# Patient Record
Sex: MALE | Race: WHITE | NOT HISPANIC OR LATINO | Employment: FULL TIME | URBAN - METROPOLITAN AREA
[De-identification: names, ages, dates, MRNs, and addresses within clinical notes are randomized per-mention and may not be internally consistent; named-entity substitution may affect disease eponyms.]

---

## 2017-06-06 ENCOUNTER — ALLSCRIPTS OFFICE VISIT (OUTPATIENT)
Dept: OTHER | Facility: OTHER | Age: 37
End: 2017-06-06

## 2017-08-24 ENCOUNTER — ALLSCRIPTS OFFICE VISIT (OUTPATIENT)
Dept: OTHER | Facility: OTHER | Age: 37
End: 2017-08-24

## 2018-01-12 VITALS
SYSTOLIC BLOOD PRESSURE: 136 MMHG | TEMPERATURE: 98.6 F | WEIGHT: 248.2 LBS | HEIGHT: 71 IN | HEART RATE: 102 BPM | OXYGEN SATURATION: 96 % | DIASTOLIC BLOOD PRESSURE: 94 MMHG | BODY MASS INDEX: 34.75 KG/M2

## 2018-01-13 VITALS
OXYGEN SATURATION: 98 % | WEIGHT: 244.38 LBS | TEMPERATURE: 98.1 F | SYSTOLIC BLOOD PRESSURE: 130 MMHG | DIASTOLIC BLOOD PRESSURE: 82 MMHG | BODY MASS INDEX: 34.21 KG/M2 | HEART RATE: 93 BPM | HEIGHT: 71 IN

## 2018-03-06 DIAGNOSIS — M25.519 SHOULDER PAIN, UNSPECIFIED CHRONICITY, UNSPECIFIED LATERALITY: ICD-10-CM

## 2018-03-06 DIAGNOSIS — F41.9 ANXIETY: Primary | ICD-10-CM

## 2018-03-06 RX ORDER — CLONAZEPAM 0.5 MG/1
0.5 TABLET ORAL EVERY 12 HOURS PRN
Qty: 60 TABLET | Refills: 0 | OUTPATIENT
Start: 2018-03-06 | End: 2018-10-19 | Stop reason: SDUPTHER

## 2018-03-06 RX ORDER — CLONAZEPAM 0.5 MG/1
1 TABLET ORAL EVERY 12 HOURS PRN
COMMUNITY
Start: 2016-12-06 | End: 2018-03-06 | Stop reason: SDUPTHER

## 2018-03-06 RX ORDER — TRAMADOL HYDROCHLORIDE 50 MG/1
1 TABLET ORAL 3 TIMES DAILY PRN
COMMUNITY
Start: 2016-12-06 | End: 2018-03-06 | Stop reason: SDUPTHER

## 2018-03-06 RX ORDER — TRAMADOL HYDROCHLORIDE 50 MG/1
50 TABLET ORAL 3 TIMES DAILY PRN
Qty: 45 TABLET | Refills: 0 | OUTPATIENT
Start: 2018-03-06 | End: 2018-10-19 | Stop reason: SDUPTHER

## 2018-04-18 ENCOUNTER — OFFICE VISIT (OUTPATIENT)
Dept: FAMILY MEDICINE CLINIC | Facility: CLINIC | Age: 38
End: 2018-04-18
Payer: COMMERCIAL

## 2018-04-18 VITALS
TEMPERATURE: 98.2 F | DIASTOLIC BLOOD PRESSURE: 74 MMHG | BODY MASS INDEX: 36.22 KG/M2 | SYSTOLIC BLOOD PRESSURE: 122 MMHG | OXYGEN SATURATION: 95 % | WEIGHT: 253 LBS | HEART RATE: 89 BPM | HEIGHT: 70 IN

## 2018-04-18 DIAGNOSIS — R06.2 WHEEZING: ICD-10-CM

## 2018-04-18 DIAGNOSIS — J45.901 ALLERGIC BRONCHITIS WITH ACUTE EXACERBATION: Primary | ICD-10-CM

## 2018-04-18 DIAGNOSIS — F90.1 ATTENTION-DEFICIT HYPERACTIVITY DISORDER, PREDOMINANTLY HYPERACTIVE TYPE: ICD-10-CM

## 2018-04-18 DIAGNOSIS — F40.10 SOCIAL ANXIETY DISORDER: ICD-10-CM

## 2018-04-18 PROCEDURE — 99213 OFFICE O/P EST LOW 20 MIN: CPT | Performed by: NURSE PRACTITIONER

## 2018-04-18 PROCEDURE — 3008F BODY MASS INDEX DOCD: CPT | Performed by: NURSE PRACTITIONER

## 2018-04-18 RX ORDER — MONTELUKAST SODIUM 10 MG/1
10 TABLET ORAL
Qty: 30 TABLET | Refills: 2 | Status: SHIPPED | OUTPATIENT
Start: 2018-04-18 | End: 2018-08-07 | Stop reason: SDUPTHER

## 2018-04-18 RX ORDER — METHYLPREDNISOLONE 4 MG/1
TABLET ORAL
Qty: 21 TABLET | Refills: 0 | Status: SHIPPED | OUTPATIENT
Start: 2018-04-18 | End: 2019-12-12 | Stop reason: ALTCHOICE

## 2018-04-18 NOTE — PROGRESS NOTES
Assessment/Plan:    No problem-specific Assessment & Plan notes found for this encounter  Diagnoses and all orders for this visit:    Allergic bronchitis with acute exacerbation  -     montelukast (SINGULAIR) 10 mg tablet; Take 1 tablet (10 mg total) by mouth daily at bedtime for 90 days  -     Methylprednisolone 4 MG TBPK; Use as directed on package    Wheezing  -     montelukast (SINGULAIR) 10 mg tablet; Take 1 tablet (10 mg total) by mouth daily at bedtime for 90 days  -     Methylprednisolone 4 MG TBPK; Use as directed on package    Attention-deficit hyperactivity disorder, predominantly hyperactive type    Social anxiety disorder          Subjective:      Patient ID: Erik Pena is a 41 y o  male  Patient here and reports that he was sick about a month ago and had symptoms for about a week with flu like symptoms and going away but has been having wheezing and feeling some chest tightness and past few days and occasional coughing with deep breathing         The following portions of the patient's history were reviewed and updated as appropriate:   He  has no past medical history on file  He   Patient Active Problem List    Diagnosis Date Noted    Wheezing 04/18/2018    Allergic bronchitis with acute exacerbation 04/18/2018    Attention-deficit hyperactivity disorder, predominantly hyperactive type 06/06/2017    Social anxiety disorder 06/06/2017     He  has no past surgical history on file  His family history is not on file  He  has no tobacco, alcohol, and drug history on file  He has No Known Allergies       Review of Systems   Constitutional: Negative  Negative for activity change, appetite change, chills, fatigue and fever  HENT: Positive for congestion and postnasal drip  Respiratory: Positive for cough and chest tightness  Cardiovascular: Negative  Gastrointestinal: Negative  Endocrine: Negative  Genitourinary: Negative  Musculoskeletal: Negative  Allergic/Immunologic: Negative  Neurological: Negative  Hematological: Negative  Psychiatric/Behavioral: Negative  Objective:      /74   Pulse 89   Temp 98 2 °F (36 8 °C)   Ht 5' 10" (1 778 m)   Wt 115 kg (253 lb)   SpO2 95%   BMI 36 30 kg/m²          Physical Exam   Constitutional: He is oriented to person, place, and time  Vital signs are normal  He appears well-developed and well-nourished  No distress  HENT:   Head: Normocephalic and atraumatic  Eyes: Pupils are equal, round, and reactive to light  Neck: Normal range of motion  No thyromegaly present  Cardiovascular: Normal rate, regular rhythm, normal heart sounds and intact distal pulses  No murmur heard  Pulmonary/Chest: Effort normal and breath sounds normal  No respiratory distress  He has no wheezes  Abdominal: Soft  Bowel sounds are normal    Musculoskeletal: Normal range of motion  Neurological: He is alert and oriented to person, place, and time  Skin: Skin is warm and dry  Psychiatric: He has a normal mood and affect  Nursing note and vitals reviewed

## 2018-08-07 DIAGNOSIS — J45.901 ALLERGIC BRONCHITIS WITH ACUTE EXACERBATION: ICD-10-CM

## 2018-08-07 DIAGNOSIS — R06.2 WHEEZING: ICD-10-CM

## 2018-08-07 RX ORDER — MONTELUKAST SODIUM 10 MG/1
TABLET ORAL
Qty: 30 TABLET | Refills: 2 | Status: SHIPPED | OUTPATIENT
Start: 2018-08-07 | End: 2020-07-31 | Stop reason: ALTCHOICE

## 2018-09-17 ENCOUNTER — TELEPHONE (OUTPATIENT)
Dept: FAMILY MEDICINE CLINIC | Facility: CLINIC | Age: 38
End: 2018-09-17

## 2018-10-19 DIAGNOSIS — M25.519 SHOULDER PAIN, UNSPECIFIED CHRONICITY, UNSPECIFIED LATERALITY: ICD-10-CM

## 2018-10-19 DIAGNOSIS — F41.9 ANXIETY: ICD-10-CM

## 2018-10-19 RX ORDER — TRAMADOL HYDROCHLORIDE 50 MG/1
50 TABLET ORAL 3 TIMES DAILY PRN
Qty: 45 TABLET | Refills: 0 | OUTPATIENT
Start: 2018-10-19 | End: 2019-06-11 | Stop reason: SDUPTHER

## 2018-10-19 RX ORDER — CLONAZEPAM 0.5 MG/1
0.5 TABLET ORAL EVERY 12 HOURS PRN
Qty: 60 TABLET | Refills: 0 | Status: SHIPPED | OUTPATIENT
Start: 2018-10-19 | End: 2019-06-11 | Stop reason: SDUPTHER

## 2019-03-15 ENCOUNTER — TELEPHONE (OUTPATIENT)
Dept: SURGERY | Facility: CLINIC | Age: 39
End: 2019-03-15

## 2019-03-18 ENCOUNTER — OFFICE VISIT (OUTPATIENT)
Dept: SURGERY | Facility: CLINIC | Age: 39
End: 2019-03-18
Payer: COMMERCIAL

## 2019-03-18 VITALS
SYSTOLIC BLOOD PRESSURE: 132 MMHG | BODY MASS INDEX: 35.65 KG/M2 | WEIGHT: 249 LBS | HEART RATE: 88 BPM | RESPIRATION RATE: 18 BRPM | HEIGHT: 70 IN | TEMPERATURE: 97.4 F | DIASTOLIC BLOOD PRESSURE: 70 MMHG

## 2019-03-18 DIAGNOSIS — L72.0 EPIDERMOID CYST: Primary | ICD-10-CM

## 2019-03-18 PROCEDURE — 88304 TISSUE EXAM BY PATHOLOGIST: CPT | Performed by: PATHOLOGY

## 2019-03-18 PROCEDURE — 99242 OFF/OP CONSLTJ NEW/EST SF 20: CPT | Performed by: SURGERY

## 2019-03-18 PROCEDURE — 11420 EXC H-F-NK-SP B9+MARG 0.5/<: CPT | Performed by: SURGERY

## 2019-03-18 RX ORDER — CYCLOBENZAPRINE HCL 10 MG
TABLET ORAL
COMMUNITY
End: 2022-04-20 | Stop reason: ALTCHOICE

## 2019-03-18 NOTE — LETTER
March 18, 2019     Patient: Alyssa Caban   YOB: 1980   Date of Visit: 3/18/2019       To Whom it May Concern:    Jorge Santino is under my professional care  He was seen in my office on 3/18/2019  Please excuse him from work on 03/18/2019 and 03/19/2019   If you have any questions or concerns, please don't hesitate to call           Sincerely,          Dax Ferrer PA-C        CC: No Recipients

## 2019-03-18 NOTE — LETTER
March 18, 2019     Patient: Jarret Hansen   YOB: 1980   Date of Visit: 3/18/2019       To Whom it May Concern:    Tia Roche is under my professional care  He was seen in my office on 3/18/2019  Please excuse him from work on this date  If you have any questions or concerns, please don't hesitate to call           Sincerely,          Dax Ferrer PA-C        CC: No Recipients

## 2019-03-18 NOTE — PROGRESS NOTES
Consult - General Surgery   Debra Pages 41 y o  male MRN: [id number]  Encounter: [id number]    Assessment/Plan    Epidermoid cyst  Patient presents with small, round, smooth nodules of the scalp consistent with 3 small epidermoid cysts and 1 recurrent epidermoid cyst  Excisional biopsy was offered  Risks related to anesthesia, bleeding, and infection were reviewed  Questions answered, informed consent obtained  Procedure completed uneventfully  Wound care instructions reviewed  Follow-up arranged  Diagnoses and all orders for this visit:    Epidermoid cyst  -     Skin excision    Other orders  -     cyclobenzaprine (FLEXERIL) 10 mg tablet; cyclobenzaprine 10 mg tablet  -     Cancel: Biopsy        History of Present Illness   03- Patient is here today for cyst on his scalp x 4  His has three on his right side of his scalp and on his on the back of his scalp and this one more is bothersome to him  Trevon Cheyenne    Pleasant 42 yo M here for cysts of scalp  Has history of one excision previously on his scalp with concern about recurrence as well as three others (4 total)  No signs of infection or inflammation  Bothersome  Interested in excision  Review of Systems   Constitutional: Negative for chills and fever  Respiratory: Negative for shortness of breath  Cardiovascular: Negative for chest pain  Gastrointestinal: Negative for abdominal pain  Skin: Negative for rash  Neurological: Negative for headaches         Historical Information   Past Medical History:   Diagnosis Date    Anxiety      Past Surgical History:   Procedure Laterality Date    SHOULDER ARTHROSCOPY Right     SPINAL FUSION       Social History   Social History     Substance and Sexual Activity   Alcohol Use Yes    Alcohol/week: 4 2 oz    Types: 7 Cans of beer per week    Frequency: 2-3 times a week    Drinks per session: 1 or 2    Binge frequency: Monthly     Social History     Substance and Sexual Activity   Drug Use Never     Social History     Tobacco Use   Smoking Status Light Tobacco Smoker    Types: Cigars   Smokeless Tobacco Never Used     Family History   Problem Relation Age of Onset    Lung cancer Father        Meds/Allergies     Current Outpatient Medications:     clonazePAM (KlonoPIN) 0 5 mg tablet, Take 1 tablet (0 5 mg total) by mouth every 12 (twelve) hours as needed for anxiety, Disp: 60 tablet, Rfl: 0    cyclobenzaprine (FLEXERIL) 10 mg tablet, cyclobenzaprine 10 mg tablet, Disp: , Rfl:     montelukast (SINGULAIR) 10 mg tablet, TAKE 1 TABLET (10 MG TOTAL) BY MOUTH DAILY AT BEDTIME, Disp: 30 tablet, Rfl: 2    traMADol (ULTRAM) 50 mg tablet, Take 1 tablet (50 mg total) by mouth 3 (three) times a day as needed for moderate pain, Disp: 45 tablet, Rfl: 0    Methylprednisolone 4 MG TBPK, Use as directed on package (Patient not taking: Reported on 3/18/2019), Disp: 21 tablet, Rfl: 0  Allergies   Allergen Reactions    Other Abdominal Pain     pollen       The following portions of the patient's history were reviewed and updated as appropriate: allergies, current medications, past family history, past medical history, past social history, past surgical history and problem list     Objective   Current Vitals:   Blood pressure 132/70, pulse 88, temperature (!) 97 4 °F (36 3 °C), temperature source Tympanic, resp  rate 18, height 5' 10" (1 778 m), weight 113 kg (249 lb)  Physical Exam   Constitutional: He is oriented to person, place, and time  He appears well-developed and well-nourished  No distress  HENT:   Head: Normocephalic and atraumatic  Eyes: Pupils are equal, round, and reactive to light  Neck: Normal range of motion  Pulmonary/Chest: No respiratory distress  Musculoskeletal: Normal range of motion  Neurological: He is alert and oriented to person, place, and time  Skin: Skin is warm and dry  He is not diaphoretic  Psychiatric: He has a normal mood and affect       Skin excision  Date/Time: 3/18/2019 2:16 PM  Performed by: Rajeev Ferrer PA-C  Authorized by: Rajeev Ferrer PA-C     Procedure Details - Skin Excision:     Number of Lesions:  4  Lesion 1:     Body area:  Head/neck    Head/neck location:  Scalp (R temple cyst)    Initial size (mm):  5       Malignancy: benign lesion      Repair type:  Linear closure    Closure complexity: simple      Repair size (cm):  1     Informed consent obtained  Positioned left lateral decubitus  Skin marked around cyst  Local anesthesia provided with 5 mL of 1% lidocaine w/epinephrine and bicarbonate  Straight incision made over the cyst with #15 scalpel  Dissection carried down to cyst which was enucleated intact and sent to pathology  Hemostasis good  Skin closed with 4 simple interrupted sutures of 3-0 nylon  Neosporin applied  No complications  Lesion 2:     Body area:  Head/neck    Head/neck location:  Scalp (Right superior parietal cyst)        Initial size (mm):  5    Malignancy: benign lesion      Repair type:  Linear closure    Closure complexity: simple      Repair size (cm):  1     Informed consent obtained  Positioned left lateral decubitus  Skin marked around cyst  Local anesthesia provided with 5 mL of 1% lidocaine w/epinephrine and bicarbonate  Straight incision made over the cyst with #15 scalpel  Dissection carried down to cyst which was removed in pieces and sent to pathology  No residual cyst wall or sebaceous material identified  Hemostasis good  Skin closed with 4 simple interrupted sutures of 3-0 nylon  Neosporin applied  No complications  Lesion 3:     Body area:  Head/neck    Head/neck location:  Scalp (Right inferior parietal)        Initial size (mm):  5    Malignancy: benign lesion      Repair type:  Linear closure    Closure complexity: simple       Informed consent obtained  Positioned left lateral decubitus   Skin marked around cyst  Local anesthesia provided with 5 mL of 1% lidocaine w/epinephrine and bicarbonate  Straight incision made over the cyst with #15 scalpel  Dissection carried down to cyst which was removed in pieces, there was not enough material to be sent to pathology  No residual cyst wall or sebaceous material identified  Hemostasis good  Skin closed with 4 simple interrupted sutures of 3-0 nylon  Neosporin applied  No complications  Lesion 4:     Body area:  Head/neck        Initial size (mm):  10    Malignancy: benign lesion      Repair type:  Linear closure    Closure complexity: simple      Repair size (cm):  1 5     Informed consent obtained  Positioned prone  Skin marked around cyst  Local anesthesia provided with 10 mL of 1% lidocaine w/epinephrine and bicarbonate  Straight incision made over the cyst with #15 scalpel  Dissection carried down to cyst which was removed in pieces and sent to pathology  No residual cyst wall or sebaceous material identified  Hemostasis good  Skin closed with 4 simple interrupted sutures of 3-0 nylon  Neosporin applied  No complications          Signature:  Syeda Ferrer PA-C  Date: 3/18/2019 Time: 2:14 PM

## 2019-03-18 NOTE — ASSESSMENT & PLAN NOTE
Patient presents with small, round, smooth nodules of the scalp consistent with 3 small epidermoid cysts and 1 recurrent epidermoid cyst  Excisional biopsy was offered  Risks related to anesthesia, bleeding, and infection were reviewed  Questions answered, informed consent obtained  Procedure completed uneventfully  Wound care instructions reviewed  Follow-up arranged

## 2019-03-21 ENCOUNTER — TELEPHONE (OUTPATIENT)
Dept: SURGERY | Facility: CLINIC | Age: 39
End: 2019-03-21

## 2019-03-21 NOTE — TELEPHONE ENCOUNTER
Phone patient to see how he was doing after his excision of scalp cyst on Monday 3-  Patient stated that the area's are a little puffing with no redness or drainage

## 2019-03-27 ENCOUNTER — OFFICE VISIT (OUTPATIENT)
Dept: SURGERY | Facility: CLINIC | Age: 39
End: 2019-03-27

## 2019-03-27 DIAGNOSIS — L72.12 TRICHILEMMAL CYST: Primary | ICD-10-CM

## 2019-03-27 PROCEDURE — 99024 POSTOP FOLLOW-UP VISIT: CPT | Performed by: PHYSICIAN ASSISTANT

## 2019-03-27 NOTE — PROGRESS NOTES
Post-Op Note - General Surgery   Natalie Mason 46 y o  male MRN: [id number]  Encounter: [id number]    Assessment/Plan    Trichilemmal cyst  Patient well after excision of trichilemmal cysts  No signs of infection  Wounds healing well  Benign pathology reviewed  Questions answered  Follow-up as needed  Diagnoses and all orders for this visit:    Trichilemmal cyst        Subjective    03- Patient is here today for suture removal scalp cyst x 4  The sutures were removed and the patient denied any new problems or concerns  Tanya Joy Minor 47 yo M here after elective trichilemmal cyst removal x 4 of his scalp  Reports no problems post-op  No pain, fevers, redness, drainage  Review of Systems   Constitutional: Negative for chills and fever  Respiratory: Negative for shortness of breath  Cardiovascular: Negative for chest pain  Gastrointestinal: Negative for abdominal pain  Skin: Negative for rash  Neurological: Negative for headaches  The following portions of the patient's history were reviewed and updated as appropriate: allergies, current medications, past family history, past medical history, past social history, past surgical history and problem list     Objective      There were no vitals taken for this visit  Physical Exam   Constitutional: He is oriented to person, place, and time  He appears well-developed and well-nourished  No distress  HENT:   Head: Normocephalic and atraumatic  Eyes: Pupils are equal, round, and reactive to light  Conjunctivae and EOM are normal    Neck: Normal range of motion  Pulmonary/Chest: No respiratory distress  Musculoskeletal: Normal range of motion  Neurological: He is alert and oriented to person, place, and time  Skin: Skin is warm and dry  Capillary refill takes less than 2 seconds  He is not diaphoretic  Incisions C/D/I and healing well  Psychiatric: He has a normal mood and affect   His behavior is normal  Signature:  Pradeep Ferrer PA-C  Date: 3/29/2019 Time: 10:44 AM

## 2019-03-29 PROBLEM — L72.12 TRICHILEMMAL CYST: Status: ACTIVE | Noted: 2019-03-18

## 2019-03-29 NOTE — ASSESSMENT & PLAN NOTE
Patient well after excision of trichilemmal cysts  No signs of infection  Wounds healing well  Benign pathology reviewed  Questions answered  Follow-up as needed

## 2019-06-11 DIAGNOSIS — M25.519 SHOULDER PAIN, UNSPECIFIED CHRONICITY, UNSPECIFIED LATERALITY: ICD-10-CM

## 2019-06-11 DIAGNOSIS — F41.9 ANXIETY: ICD-10-CM

## 2019-06-11 RX ORDER — CLONAZEPAM 0.5 MG/1
0.5 TABLET ORAL EVERY 12 HOURS PRN
Qty: 60 TABLET | Refills: 0 | Status: SHIPPED | OUTPATIENT
Start: 2019-06-11 | End: 2019-12-12 | Stop reason: SDUPTHER

## 2019-06-11 RX ORDER — TRAMADOL HYDROCHLORIDE 50 MG/1
50 TABLET ORAL 3 TIMES DAILY PRN
Qty: 45 TABLET | Refills: 0 | OUTPATIENT
Start: 2019-06-11 | End: 2019-06-14 | Stop reason: SDUPTHER

## 2019-06-14 DIAGNOSIS — M25.519 SHOULDER PAIN, UNSPECIFIED CHRONICITY, UNSPECIFIED LATERALITY: ICD-10-CM

## 2019-06-14 RX ORDER — TRAMADOL HYDROCHLORIDE 50 MG/1
50 TABLET ORAL 3 TIMES DAILY PRN
Qty: 45 TABLET | Refills: 0 | Status: SHIPPED | OUTPATIENT
Start: 2019-06-14 | End: 2019-12-12 | Stop reason: SDUPTHER

## 2019-06-16 NOTE — TELEPHONE ENCOUNTER
This is not my patient  He also hasnt been seen on over a year   No refill sof a controlled substance

## 2019-11-18 DIAGNOSIS — M25.519 SHOULDER PAIN, UNSPECIFIED CHRONICITY, UNSPECIFIED LATERALITY: ICD-10-CM

## 2019-11-18 DIAGNOSIS — F41.9 ANXIETY: ICD-10-CM

## 2019-11-18 RX ORDER — CLONAZEPAM 0.5 MG/1
0.5 TABLET ORAL EVERY 12 HOURS PRN
Qty: 60 TABLET | Refills: 0 | OUTPATIENT
Start: 2019-11-18

## 2019-11-18 RX ORDER — TRAMADOL HYDROCHLORIDE 50 MG/1
50 TABLET ORAL 3 TIMES DAILY PRN
Qty: 45 TABLET | Refills: 0 | OUTPATIENT
Start: 2019-11-18

## 2019-12-04 LAB — HBA1C MFR BLD HPLC: 5.4 %

## 2019-12-12 ENCOUNTER — OFFICE VISIT (OUTPATIENT)
Dept: FAMILY MEDICINE CLINIC | Facility: CLINIC | Age: 39
End: 2019-12-12
Payer: COMMERCIAL

## 2019-12-12 VITALS
WEIGHT: 250 LBS | HEIGHT: 70 IN | TEMPERATURE: 98.8 F | OXYGEN SATURATION: 98 % | DIASTOLIC BLOOD PRESSURE: 88 MMHG | SYSTOLIC BLOOD PRESSURE: 136 MMHG | BODY MASS INDEX: 35.79 KG/M2 | HEART RATE: 94 BPM

## 2019-12-12 DIAGNOSIS — Z23 NEED FOR TDAP VACCINATION: ICD-10-CM

## 2019-12-12 DIAGNOSIS — F40.10 SOCIAL ANXIETY DISORDER: ICD-10-CM

## 2019-12-12 DIAGNOSIS — F41.9 ANXIETY: ICD-10-CM

## 2019-12-12 DIAGNOSIS — Z11.3 SCREENING EXAMINATION FOR STD (SEXUALLY TRANSMITTED DISEASE): ICD-10-CM

## 2019-12-12 DIAGNOSIS — M25.511 CHRONIC RIGHT SHOULDER PAIN: Primary | ICD-10-CM

## 2019-12-12 DIAGNOSIS — G89.29 CHRONIC RIGHT SHOULDER PAIN: Primary | ICD-10-CM

## 2019-12-12 DIAGNOSIS — Z98.890 S/P SHOULDER SURGERY: ICD-10-CM

## 2019-12-12 DIAGNOSIS — Z79.899 CONTROLLED SUBSTANCE AGREEMENT SIGNED: ICD-10-CM

## 2019-12-12 DIAGNOSIS — E66.09 CLASS 2 OBESITY DUE TO EXCESS CALORIES WITHOUT SERIOUS COMORBIDITY WITH BODY MASS INDEX (BMI) OF 35.0 TO 35.9 IN ADULT: ICD-10-CM

## 2019-12-12 DIAGNOSIS — Z23 NEEDS FLU SHOT: ICD-10-CM

## 2019-12-12 DIAGNOSIS — Z13.220 SCREENING FOR LIPID DISORDERS: ICD-10-CM

## 2019-12-12 DIAGNOSIS — M25.519 SHOULDER PAIN, UNSPECIFIED CHRONICITY, UNSPECIFIED LATERALITY: ICD-10-CM

## 2019-12-12 PROBLEM — J45.901 ALLERGIC BRONCHITIS WITH ACUTE EXACERBATION: Status: RESOLVED | Noted: 2018-04-18 | Resolved: 2019-12-12

## 2019-12-12 PROBLEM — R06.2 WHEEZING: Status: RESOLVED | Noted: 2018-04-18 | Resolved: 2019-12-12

## 2019-12-12 PROBLEM — E66.812 CLASS 2 OBESITY DUE TO EXCESS CALORIES WITHOUT SERIOUS COMORBIDITY WITH BODY MASS INDEX (BMI) OF 35.0 TO 35.9 IN ADULT: Status: ACTIVE | Noted: 2019-12-12

## 2019-12-12 PROCEDURE — 3008F BODY MASS INDEX DOCD: CPT | Performed by: NURSE PRACTITIONER

## 2019-12-12 PROCEDURE — 90686 IIV4 VACC NO PRSV 0.5 ML IM: CPT | Performed by: NURSE PRACTITIONER

## 2019-12-12 PROCEDURE — 90472 IMMUNIZATION ADMIN EACH ADD: CPT | Performed by: NURSE PRACTITIONER

## 2019-12-12 PROCEDURE — 99214 OFFICE O/P EST MOD 30 MIN: CPT | Performed by: NURSE PRACTITIONER

## 2019-12-12 PROCEDURE — 90471 IMMUNIZATION ADMIN: CPT | Performed by: NURSE PRACTITIONER

## 2019-12-12 PROCEDURE — 90715 TDAP VACCINE 7 YRS/> IM: CPT | Performed by: NURSE PRACTITIONER

## 2019-12-12 RX ORDER — CLONAZEPAM 0.5 MG/1
0.5 TABLET ORAL EVERY 12 HOURS PRN
Qty: 60 TABLET | Refills: 0 | Status: SHIPPED | OUTPATIENT
Start: 2019-12-12 | End: 2020-03-02 | Stop reason: SDUPTHER

## 2019-12-12 RX ORDER — TRAMADOL HYDROCHLORIDE 50 MG/1
50 TABLET ORAL 3 TIMES DAILY PRN
Qty: 45 TABLET | Refills: 0 | Status: SHIPPED | OUTPATIENT
Start: 2019-12-12 | End: 2020-03-02 | Stop reason: SDUPTHER

## 2019-12-12 NOTE — PATIENT INSTRUCTIONS
Exercises for Shoulder Flexion and Extension   WHAT YOU NEED TO KNOW:   Shoulder flexion and extension exercises work the muscles in your upper back  DISCHARGE INSTRUCTIONS:   Contact your healthcare provider if:   · You have sharp or worsening pain during exercise or at rest     · You have questions or concerns about your shoulder exercises  Before you exercise:  Warm up and stretch before you exercise  Walk or ride a stationary bike for 5 to 10 minutes to help you warm up  Stretching helps increase range of motion  It may also decrease muscle soreness and help prevent another injury  Your healthcare provider will tell you which of the following stretches to do:  · Crossover arm stretch:  Relax your shoulders  Hold your upper arm with the opposite hand  Pull your arm across your chest until you feel a stretch  Hold the stretch for 30 seconds  Return to the starting position  · Shoulder flexion stretch:  Stand facing a wall  Slowly walk your fingers up the wall until you feel a stretch  Hold for 30 seconds  Return to the starting position  · Sleeper stretch:  Lie on your side on a firm, flat surface with your injured arm tucked under you  Place your head on a pillow for comfort  Bend the elbow of your injured arm 90°  Use your arm that is not injured to slowly push your injured arm down  Stop when you feel a stretch at the back of your injured shoulder  Hold for 30 seconds  Slowly return to the starting position  How to exercise with a weight:  Your healthcare provider will tell you how much weight to exercise with  · Shoulder extension:  Lie on a hard table on your stomach  Let your arms hang off the side  Hold a weight in both hands with your palms facing toward your body  Keep your arms straight and slowly raise your arms parallel to the floor in a "Y" shape  Stop when your arms are level with your body  Hold for as long as directed  Slowly return to the starting position       · Shoulder flexion: Stand and hold a weight in the hand of your injured shoulder  Keep your arm straight and slowly raise your arm over your head as far as you can without pain  Do not raise your arm over your head unless your healthcare provider says it is okay  Do not let your shoulder shrug  Hold this position for as many seconds as directed  Slowly return to the starting position  How to exercise with an exercise band:   · Shoulder extension:  Wrap the exercise band around a heavy, stable object  The band should be level with your chest  Stand and hold each end of the band in both hands  Step back and extend your arms straight  Squeeze your shoulder blades together and pull your arms back and down  Hold for as long as directed  Slowly return to the starting position  · Shoulder flexion:  Wrap the exercise band around a heavy, stable object near your foot  Grab the band with the hand of your injured shoulder  Keep your arm straight  Slowly pull the band up and past your head as far as you can without pain  Do not raise your arm over your head unless your healthcare provider says it is okay  Do not let your shoulder shrug  Hold this position for as many seconds as directed  Slowly return to the starting position  Follow up with your physical therapist as directed:  Write down your questions so you remember to ask them during your visits  © 2017 [employer]  Information is for End User's use only and may not be sold, redistributed or otherwise used for commercial purposes  All illustrations and images included in CareNotes® are the copyrighted property of A [employer]  or [employer]aveen [employer]  The above information is an  only  It is not intended as medical advice for individual conditions or treatments  Talk to your doctor, nurse or pharmacist before following any medical regimen to see if it is safe and effective for you

## 2019-12-12 NOTE — PROGRESS NOTES
BMI Counseling: Body mass index is 35 87 kg/m²  The BMI is above normal  Nutrition recommendations include 3-5 servings of fruits/vegetables daily, reducing fast food intake, consuming healthier snacks and decreasing soda and/or juice intake  Exercise recommendations include exercising 3-5 times per week  Assessment/Plan:    No problem-specific Assessment & Plan notes found for this encounter  Diagnoses and all orders for this visit:    Chronic right shoulder pain  Comments:  Patient doing well on the prn tramadol     Class 2 obesity due to excess calories without serious comorbidity with body mass index (BMI) of 35 0 to 35 9 in adult    Social anxiety disorder  Comments:  doing well on the prn clonazepam   Orders:  -     Comprehensive metabolic panel; Future  -     CBC and differential; Future  -     TSH, 3rd generation with Free T4 reflex; Future    Screening examination for STD (sexually transmitted disease)  -     HIV 1/2 AG-AB combo; Future  -     RPR; Future  -     Chlamydia/GC amplified DNA by PCR; Future  -     Hepatitis C antibody; Future  -     Herpes I/II IgG SARAH w Reflex to HSV-2; Future    Screening for lipid disorders  -     Lipid Panel with Direct LDL reflex; Future    S/P shoulder surgery    Controlled substance agreement signed    Shoulder pain, unspecified chronicity, unspecified laterality  -     traMADol (ULTRAM) 50 mg tablet; Take 1 tablet (50 mg total) by mouth 3 (three) times a day as needed for moderate pain    Anxiety  -     clonazePAM (KlonoPIN) 0 5 mg tablet; Take 1 tablet (0 5 mg total) by mouth every 12 (twelve) hours as needed for anxiety          Subjective:      Patient ID: Mel Byers is a 46 y o  male  Patient here today for his check up  Patient reports that he had an episode of lesions about three on his penis and was painful in nature and lasted about 1 1/2 weeks and has gone away and has not returned  Patient reports that he is in a monagamus relationship   Patient is working full time and doing well  Patient is taking tramadol taking typically several times a week once a day  Patient using for his chronic shoulder pain on the right has a history of labrium tear and s/p surgical repair  Patient symptoms are somewhat well managed with the tramadol  Pain is worse with weather changes  Patient does feel that he is getting adequate relief would like to have some exercises he can try at home  Patient is also taking the prn clonazepam as needed and only taking maybe once a week patient reports that it is very helpful       The following portions of the patient's history were reviewed and updated as appropriate:   He  has a past medical history of Anxiety, Concussion, and Pyloric stenosis  He   Patient Active Problem List    Diagnosis Date Noted    Class 2 obesity due to excess calories without serious comorbidity with body mass index (BMI) of 35 0 to 35 9 in adult 12/12/2019    Screening for lipid disorders 12/12/2019    Chronic right shoulder pain 12/12/2019    S/P shoulder surgery 12/12/2019    Controlled substance agreement signed 12/12/2019    Trichilemmal cyst 03/18/2019    Attention-deficit hyperactivity disorder, predominantly hyperactive type 06/06/2017    Social anxiety disorder 06/06/2017     He  has a past surgical history that includes Spinal fusion; Shoulder arthroscopy (Right); and Hernia repair  His family history includes Lung cancer in his father; Other in his father; Sjogren's syndrome in his mother  He  reports that he has been smoking cigars  He has never used smokeless tobacco  He reports that he drinks about 7 0 standard drinks of alcohol per week  He reports that he does not use drugs  He is allergic to other       Review of Systems   Constitutional: Negative for activity change, appetite change, chills, diaphoresis, fatigue, fever and unexpected weight change     HENT: Negative for congestion, ear pain, hearing loss, postnasal drip, sinus pressure, sinus pain, sneezing and sore throat  Eyes: Negative for pain, redness and visual disturbance  Respiratory: Negative for cough and shortness of breath  Cardiovascular: Negative for chest pain and leg swelling  Gastrointestinal: Negative for abdominal pain, diarrhea, nausea and vomiting  Endocrine: Negative  Genitourinary: Negative  Musculoskeletal: Positive for arthralgias  Skin: Negative  Allergic/Immunologic: Negative  Neurological: Negative for dizziness and light-headedness  Hematological: Negative  Psychiatric/Behavioral: Negative for behavioral problems and dysphoric mood  The patient is nervous/anxious  Objective:      /88   Pulse 94   Temp 98 8 °F (37 1 °C)   Ht 5' 10" (1 778 m)   Wt 113 kg (250 lb)   SpO2 98%   BMI 35 87 kg/m²          Physical Exam   Constitutional: He is oriented to person, place, and time  Vital signs are normal  He appears well-developed and well-nourished  No distress  HENT:   Head: Normocephalic and atraumatic  Eyes: Pupils are equal, round, and reactive to light  Neck: Normal range of motion  No thyromegaly present  Cardiovascular: Normal rate, regular rhythm, normal heart sounds and intact distal pulses  No murmur heard  Pulmonary/Chest: Effort normal and breath sounds normal  No respiratory distress  He has no wheezes  Abdominal: Soft  Bowel sounds are normal    Musculoskeletal:        Right shoulder: He exhibits decreased range of motion, tenderness, pain and spasm  Neurological: He is alert and oriented to person, place, and time  Skin: Skin is warm and dry  Psychiatric: He has a normal mood and affect  Nursing note and vitals reviewed

## 2019-12-17 ENCOUNTER — TELEPHONE (OUTPATIENT)
Dept: FAMILY MEDICINE CLINIC | Facility: CLINIC | Age: 39
End: 2019-12-17

## 2019-12-17 NOTE — TELEPHONE ENCOUNTER
Can we print out shoulder exercises for him I tried at his visit but did not print ou tin his summary

## 2019-12-17 NOTE — TELEPHONE ENCOUNTER
Pt called and said that he was to get some shoulder exercises at his last office visit but he never received them  He wanted to speak with you about the labs he was to get done but I told him he could go to any [facility] lab and he said that was fine

## 2020-03-02 DIAGNOSIS — M25.519 SHOULDER PAIN, UNSPECIFIED CHRONICITY, UNSPECIFIED LATERALITY: ICD-10-CM

## 2020-03-02 DIAGNOSIS — F41.9 ANXIETY: ICD-10-CM

## 2020-03-03 RX ORDER — TRAMADOL HYDROCHLORIDE 50 MG/1
50 TABLET ORAL 3 TIMES DAILY PRN
Qty: 45 TABLET | Refills: 0 | Status: SHIPPED | OUTPATIENT
Start: 2020-03-03 | End: 2020-05-01 | Stop reason: SDUPTHER

## 2020-03-03 RX ORDER — CLONAZEPAM 0.5 MG/1
0.5 TABLET ORAL EVERY 12 HOURS PRN
Qty: 60 TABLET | Refills: 0 | Status: SHIPPED | OUTPATIENT
Start: 2020-03-03 | End: 2020-05-01 | Stop reason: SDUPTHER

## 2020-03-04 ENCOUNTER — TELEPHONE (OUTPATIENT)
Dept: FAMILY MEDICINE CLINIC | Facility: CLINIC | Age: 40
End: 2020-03-04

## 2020-03-04 ENCOUNTER — APPOINTMENT (OUTPATIENT)
Dept: LAB | Facility: CLINIC | Age: 40
End: 2020-03-04
Payer: COMMERCIAL
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
## 2020-03-04 DIAGNOSIS — Z11.3 SCREENING EXAMINATION FOR STD (SEXUALLY TRANSMITTED DISEASE): ICD-10-CM

## 2020-03-04 DIAGNOSIS — F40.10 SOCIAL ANXIETY DISORDER: ICD-10-CM

## 2020-03-04 DIAGNOSIS — S30.861A TICK BITE OF ABDOMEN, INITIAL ENCOUNTER: ICD-10-CM
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
## 2020-03-04 DIAGNOSIS — S30.861A TICK BITE OF ABDOMEN, INITIAL ENCOUNTER: Primary | ICD-10-CM

## 2020-03-04 LAB
BASOPHILS # BLD AUTO: 0.06 THOUSANDS/ΜL (ref 0–0.1)
BASOPHILS NFR BLD AUTO: 1 % (ref 0–1)
EOSINOPHIL # BLD AUTO: 0.04 THOUSAND/ΜL (ref 0–0.61)
EOSINOPHIL NFR BLD AUTO: 1 % (ref 0–6)
ERYTHROCYTE [DISTWIDTH] IN BLOOD BY AUTOMATED COUNT: 12.4 % (ref 11.6–15.1)
HCT VFR BLD AUTO: 51.2 % (ref 36.5–49.3)
HGB BLD-MCNC: 17.3 G/DL (ref 12–17)
IMM GRANULOCYTES # BLD AUTO: 0.02 THOUSAND/UL (ref 0–0.2)
IMM GRANULOCYTES NFR BLD AUTO: 0 % (ref 0–2)
LYMPHOCYTES # BLD AUTO: 2.65 THOUSANDS/ΜL (ref 0.6–4.47)
LYMPHOCYTES NFR BLD AUTO: 31 % (ref 14–44)
MCH RBC QN AUTO: 29.6 PG (ref 26.8–34.3)
MCHC RBC AUTO-ENTMCNC: 33.8 G/DL (ref 31.4–37.4)
MCV RBC AUTO: 88 FL (ref 82–98)
MONOCYTES # BLD AUTO: 0.84 THOUSAND/ΜL (ref 0.17–1.22)
MONOCYTES NFR BLD AUTO: 10 % (ref 4–12)
NEUTROPHILS # BLD AUTO: 4.87 THOUSANDS/ΜL (ref 1.85–7.62)
NEUTS SEG NFR BLD AUTO: 57 % (ref 43–75)
NRBC BLD AUTO-RTO: 0 /100 WBCS
PLATELET # BLD AUTO: 237 THOUSANDS/UL (ref 149–390)
PMV BLD AUTO: 11.6 FL (ref 8.9–12.7)
RBC # BLD AUTO: 5.85 MILLION/UL (ref 3.88–5.62)
WBC # BLD AUTO: 8.48 THOUSAND/UL (ref 4.31–10.16)

## 2020-03-04 PROCEDURE — 86803 HEPATITIS C AB TEST: CPT

## 2020-03-04 PROCEDURE — 87491 CHLMYD TRACH DNA AMP PROBE: CPT

## 2020-03-04 PROCEDURE — 85025 COMPLETE CBC W/AUTO DIFF WBC: CPT

## 2020-03-04 PROCEDURE — 36415 COLL VENOUS BLD VENIPUNCTURE: CPT

## 2020-03-04 PROCEDURE — 87591 N.GONORRHOEAE DNA AMP PROB: CPT

## 2020-03-04 PROCEDURE — 86696 HERPES SIMPLEX TYPE 2 TEST: CPT

## 2020-03-04 PROCEDURE — 86592 SYPHILIS TEST NON-TREP QUAL: CPT

## 2020-03-04 PROCEDURE — 86695 HERPES SIMPLEX TYPE 1 TEST: CPT

## 2020-03-04 PROCEDURE — 87389 HIV-1 AG W/HIV-1&-2 AB AG IA: CPT

## 2020-03-04 PROCEDURE — 86618 LYME DISEASE ANTIBODY: CPT

## 2020-03-04 NOTE — TELEPHONE ENCOUNTER
Patient stopped by after having his blood done    They also michelle for Lyme disease - Can you place an order for the lab so they can run that too

## 2020-03-05 LAB
HCV AB SER QL: NORMAL
HIV 1+2 AB+HIV1 P24 AG SERPL QL IA: NORMAL
RPR SER QL: NORMAL

## 2020-03-06 LAB
B BURGDOR IGG+IGM SER-ACNC: <0.91 ISR (ref 0–0.9)
C TRACH DNA SPEC QL NAA+PROBE: NEGATIVE
HSV1 IGG SER IA-ACNC: 9.59 INDEX (ref 0–0.9)
HSV2 IGG SER IA-ACNC: <0.91 INDEX (ref 0–0.9)
N GONORRHOEA DNA SPEC QL NAA+PROBE: NEGATIVE

## 2020-05-01 DIAGNOSIS — M25.519 SHOULDER PAIN, UNSPECIFIED CHRONICITY, UNSPECIFIED LATERALITY: ICD-10-CM

## 2020-05-01 DIAGNOSIS — F41.9 ANXIETY: ICD-10-CM

## 2020-05-01 RX ORDER — TRAMADOL HYDROCHLORIDE 50 MG/1
50 TABLET ORAL 3 TIMES DAILY PRN
Qty: 45 TABLET | Refills: 0 | Status: SHIPPED | OUTPATIENT
Start: 2020-05-01 | End: 2020-07-31 | Stop reason: SDUPTHER

## 2020-05-01 RX ORDER — CLONAZEPAM 0.5 MG/1
0.5 TABLET ORAL EVERY 12 HOURS PRN
Qty: 60 TABLET | Refills: 0 | Status: SHIPPED | OUTPATIENT
Start: 2020-05-01 | End: 2020-07-31 | Stop reason: SDUPTHER

## 2020-07-20 DIAGNOSIS — F41.9 ANXIETY: ICD-10-CM

## 2020-07-20 DIAGNOSIS — M25.519 SHOULDER PAIN, UNSPECIFIED CHRONICITY, UNSPECIFIED LATERALITY: ICD-10-CM

## 2020-07-21 RX ORDER — TRAMADOL HYDROCHLORIDE 50 MG/1
50 TABLET ORAL 3 TIMES DAILY PRN
Qty: 45 TABLET | Refills: 0 | OUTPATIENT
Start: 2020-07-21

## 2020-07-21 RX ORDER — CLONAZEPAM 0.5 MG/1
0.5 TABLET ORAL EVERY 12 HOURS PRN
Qty: 60 TABLET | Refills: 0 | OUTPATIENT
Start: 2020-07-21

## 2020-07-31 ENCOUNTER — OFFICE VISIT (OUTPATIENT)
Dept: FAMILY MEDICINE CLINIC | Facility: CLINIC | Age: 40
End: 2020-07-31
Payer: COMMERCIAL

## 2020-07-31 VITALS
HEIGHT: 70 IN | HEART RATE: 102 BPM | SYSTOLIC BLOOD PRESSURE: 136 MMHG | DIASTOLIC BLOOD PRESSURE: 84 MMHG | TEMPERATURE: 98.3 F | WEIGHT: 245.8 LBS | OXYGEN SATURATION: 97 % | BODY MASS INDEX: 35.19 KG/M2

## 2020-07-31 DIAGNOSIS — M25.519 SHOULDER PAIN, UNSPECIFIED CHRONICITY, UNSPECIFIED LATERALITY: ICD-10-CM

## 2020-07-31 DIAGNOSIS — G89.29 CHRONIC RIGHT SHOULDER PAIN: Primary | ICD-10-CM

## 2020-07-31 DIAGNOSIS — F40.10 SOCIAL ANXIETY DISORDER: ICD-10-CM

## 2020-07-31 DIAGNOSIS — M10.9 PODAGRA: ICD-10-CM

## 2020-07-31 DIAGNOSIS — F41.9 ANXIETY: ICD-10-CM

## 2020-07-31 DIAGNOSIS — M25.511 CHRONIC RIGHT SHOULDER PAIN: Primary | ICD-10-CM

## 2020-07-31 PROCEDURE — 3008F BODY MASS INDEX DOCD: CPT | Performed by: NURSE PRACTITIONER

## 2020-07-31 PROCEDURE — 99214 OFFICE O/P EST MOD 30 MIN: CPT | Performed by: NURSE PRACTITIONER

## 2020-07-31 RX ORDER — TRAMADOL HYDROCHLORIDE 50 MG/1
50 TABLET ORAL 3 TIMES DAILY PRN
Qty: 45 TABLET | Refills: 0 | Status: SHIPPED | OUTPATIENT
Start: 2020-07-31 | End: 2020-09-10 | Stop reason: SDUPTHER

## 2020-07-31 RX ORDER — INDOMETHACIN 50 MG/1
50 CAPSULE ORAL 2 TIMES DAILY WITH MEALS
Qty: 28 CAPSULE | Refills: 0 | Status: SHIPPED | OUTPATIENT
Start: 2020-07-31 | End: 2020-12-27 | Stop reason: SDUPTHER

## 2020-07-31 RX ORDER — CLONAZEPAM 0.5 MG/1
0.5 TABLET ORAL EVERY 12 HOURS PRN
Qty: 60 TABLET | Refills: 0 | Status: SHIPPED | OUTPATIENT
Start: 2020-07-31 | End: 2020-10-26 | Stop reason: SDUPTHER

## 2020-07-31 NOTE — PROGRESS NOTES
Assessment/Plan:    Chronic right shoulder pain  -controlled with combination of medications, flexeril and tramadol  -will continue these medications   -will give shoulder exercises   -should pain continue, will refer to sports medicine for further evaluation and recommendations    Social anxiety disorder  -mood is currently stable  Patient doing well on Klonopin and using prn          Problem List Items Addressed This Visit        Other    Social anxiety disorder     -mood is currently stable  Patient doing well on Klonopin and using prn          Chronic right shoulder pain - Primary     -controlled with combination of medications, flexeril and tramadol  -will continue these medications   -will give shoulder exercises   -should pain continue, will refer to sports medicine for further evaluation and recommendations           Other Visit Diagnoses     Podagra        Relevant Medications    indomethacin (INDOCIN) 50 mg capsule    Anxiety        Relevant Medications    clonazePAM (KlonoPIN) 0 5 mg tablet    Shoulder pain, unspecified chronicity, unspecified laterality        Relevant Medications    traMADol (ULTRAM) 50 mg tablet            Subjective:      Patient ID: Natalie Mason is a 45 y o  male  Patient presents to a follow up  patient has chronic shoulder pain due to torn labrium with repair  Since then he has had shoulder pain  Patient states that his pain is sharp and feels like arthritic pain that is extremely intense sharp pain  Currently taking tramadol , klonopin and flexeril  Patient also states that he has gout, not diagnosed by a provider but he believes he has this  State that this inflammation occurrs in his big toe  Has been on both sides  When this happens he drinks black cherry extract which helps  Patient has some questions regarding klonopin and 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   Patient states that he has severe white-coat syndrome and his BP will go up during that physical  Patient states that his anxiety at random times  He doesn't do well with crowds and almost like social anxiety  But states that hes using them well  Hes not having any problems with his current medications  He would like a refill  The following portions of the patient's history were reviewed and updated as appropriate: He  has a past medical history of Anxiety, Concussion, and Pyloric stenosis  He   Patient Active Problem List    Diagnosis Date Noted    Class 2 obesity due to excess calories without serious comorbidity with body mass index (BMI) of 35 0 to 35 9 in adult 12/12/2019    Screening for lipid disorders 12/12/2019    Chronic right shoulder pain 12/12/2019    S/P shoulder surgery 12/12/2019    Controlled substance agreement signed 12/12/2019    Trichilemmal cyst 03/18/2019    Attention-deficit hyperactivity disorder, predominantly hyperactive type 06/06/2017    Social anxiety disorder 06/06/2017     He  has a past surgical history that includes Spinal fusion; Shoulder arthroscopy (Right); and Hernia repair  His family history includes Lung cancer in his father; Other in his father; Sjogren's syndrome in his mother  He  reports that he has been smoking cigars  He has never used smokeless tobacco  He reports that he drinks about 7 0 standard drinks of alcohol per week  He reports that he does not use drugs  Current Outpatient Medications   Medication Sig Dispense Refill    clonazePAM (KlonoPIN) 0 5 mg tablet Take 1 tablet (0 5 mg total) by mouth every 12 (twelve) hours as needed for anxiety 60 tablet 0    traMADol (ULTRAM) 50 mg tablet Take 1 tablet (50 mg total) by mouth 3 (three) times a day as needed for moderate pain 45 tablet 0    cyclobenzaprine (FLEXERIL) 10 mg tablet cyclobenzaprine 10 mg tablet      indomethacin (INDOCIN) 50 mg capsule Take 1 capsule (50 mg total) by mouth 2 (two) times a day with meals for 14 days 28 capsule 0     No current facility-administered medications for this visit        He is allergic to other       Review of Systems   Constitutional: Negative for appetite change, chills, fever and unexpected weight change  HENT: Negative for congestion, rhinorrhea, sinus pressure, sinus pain, sneezing and sore throat  Eyes: Negative  Negative for visual disturbance  Respiratory: Negative  Cardiovascular: Negative  Gastrointestinal: Negative  Endocrine: Negative  Genitourinary: Negative  Musculoskeletal: Positive for arthralgias, joint swelling and myalgias  Chronic shoulder pain, gout    Skin: Negative  Allergic/Immunologic: Positive for environmental allergies  Neurological: Negative  Hematological: Negative  Psychiatric/Behavioral: The patient is nervous/anxious  Objective:      /84   Pulse 102   Temp 98 3 °F (36 8 °C) (Tympanic)   Ht 5' 10" (1 778 m)   Wt 111 kg (245 lb 12 8 oz)   SpO2 97%   BMI 35 27 kg/m²          Physical Exam   Constitutional: He is oriented to person, place, and time  He appears well-developed and well-nourished  No distress  HENT:   Head: Normocephalic and atraumatic  Right Ear: External ear normal    Left Ear: External ear normal    Nose: Nose normal    Mouth/Throat: Oropharynx is clear and moist  No oropharyngeal exudate  Eyes: Conjunctivae are normal  No scleral icterus  Neck: Normal range of motion  Cardiovascular: Normal rate, regular rhythm and normal heart sounds  Exam reveals no gallop and no friction rub  No murmur heard  Pulmonary/Chest: Effort normal and breath sounds normal  No stridor  No respiratory distress  He has no wheezes  He has no rales  He exhibits no tenderness  Abdominal: Soft  Bowel sounds are normal  He exhibits no distension and no mass  There is no tenderness  There is no rebound and no guarding  No hernia  Musculoskeletal: Normal range of motion  He exhibits no edema, tenderness or deformity  Lymphadenopathy:     He has no cervical adenopathy     Neurological: He is alert and oriented to person, place, and time  Skin: Skin is warm  No rash noted  He is not diaphoretic  No erythema  No pallor  Psychiatric: He has a normal mood and affect  Rapid pressured speech   Vitals reviewed

## 2020-07-31 NOTE — PATIENT INSTRUCTIONS
Exercises for Internal and External Shoulder Rotation   WHAT YOU NEED TO KNOW:   Exercises for internal shoulder rotation work the muscles in your chest and front of your shoulder  Exercises for external shoulder rotation work the muscles in the back of your shoulder and upper back  DISCHARGE INSTRUCTIONS:   Contact your healthcare provider if:   · You have sharp or worsening pain during exercise or at rest     · You have questions or concerns about your shoulder exercises  Before you exercise:  Warm up and stretch before you exercise  Walk or ride a stationary bike for 5 to 10 minutes to help you warm up  Stretching helps increase range of motion  It may also decrease muscle soreness and help prevent another injury  Your healthcare provider will tell you which of the following stretches to do:  · Crossover arm stretch:  Relax your shoulders  Hold your upper arm with the opposite hand  Pull your arm across your chest until you feel a stretch  Hold the stretch for 30 seconds  Return to the starting position  · Shoulder flexion stretch:  Stand facing a wall  Slowly walk your fingers up the wall until you feel a stretch  Hold the stretch for 30 seconds  Return to the starting position  · Sleeper stretch:  Lie on your injured side on a firm, flat surface  Bend the elbow of your injured arm 90° with your hand facing up  Use your arm that is not injured to slowly push your injured arm down  Stop when you feel a stretch at the back of your injured shoulder  Hold the stretch for 30 seconds  Slowly return to the starting position  How to exercise with a weight:  Your healthcare provider will tell you how much weight to exercise with  · Shoulder internal rotation:  Sit in a chair  Place a rolled up towel between your elbow and your side  Bend your elbow to 90°  Gently squeeze the towel with your elbow to prevent it from falling out  Hold the weight with your thumb pointing up   Slowly move the weight across your chest  Stop when your hand reaches your opposite arm  Hold this position for as many seconds as directed  Slowly return to the starting position  · Shoulder external rotation:  Lie on your side with your injured shoulder facing up  Bend your elbow 90°  Place a rolled up towel between your elbow and your side  Hold a weight in your hand  Gently squeeze the towel with your elbow to prevent it from falling out  Slowly rotate your arm outward, but keep your elbow bent  Stop when you feel a stretch  Hold this position for 30 seconds or as directed  Slowly return to the starting position  How to exercise with an exercise band:   · Shoulder internal rotation:  Tie one end of the exercise band to a heavy, secure object  Sit in a chair  Place a rolled up towel between your elbow and your side  Bend your elbow to 90°  Gently squeeze the towel with your elbow to prevent it from falling out  Slowly pull the band across your chest  Stop when your hand reaches your opposite arm  Hold this position for as many seconds as directed  Slowly return to the starting position  · Shoulder external rotation:  Hold one end of the exercise band on the side that is not injured  Place a rolled up towel between your elbow and your side  Bend your elbow 90°  Squeeze the towel with your elbow  Grab the end of the band and slowly turn your arm outward, but keep your elbow bent  Stop when you feel a stretch  Hold this position for 30 seconds or as directed  Slowly return to the starting position  Follow up with your physical therapist as directed:  Write down your questions so you remember to ask them at your visits  © 2017 [employer] Information is for End User's use only and may not be sold, redistributed or otherwise used for commercial purposes  All illustrations and images included in CareNotes® are the copyrighted property of [employer]  or Naveen Wright    The above information is an  only  It is not intended as medical advice for individual conditions or treatments  Talk to your doctor, nurse or pharmacist before following any medical regimen to see if it is safe and effective for you

## 2020-07-31 NOTE — ASSESSMENT & PLAN NOTE
-controlled with combination of medications, flexeril and tramadol  -will continue these medications   -will give shoulder exercises   -should pain continue, will refer to sports medicine for further evaluation and recommendations

## 2020-09-10 DIAGNOSIS — M25.519 SHOULDER PAIN, UNSPECIFIED CHRONICITY, UNSPECIFIED LATERALITY: ICD-10-CM

## 2020-09-10 RX ORDER — TRAMADOL HYDROCHLORIDE 50 MG/1
50 TABLET ORAL 3 TIMES DAILY PRN
Qty: 45 TABLET | Refills: 0 | Status: SHIPPED | OUTPATIENT
Start: 2020-09-10 | End: 2020-10-26 | Stop reason: SDUPTHER

## 2020-10-26 DIAGNOSIS — F41.9 ANXIETY: ICD-10-CM

## 2020-10-26 DIAGNOSIS — M25.519 SHOULDER PAIN, UNSPECIFIED CHRONICITY, UNSPECIFIED LATERALITY: ICD-10-CM

## 2020-10-26 RX ORDER — TRAMADOL HYDROCHLORIDE 50 MG/1
50 TABLET ORAL 3 TIMES DAILY PRN
Qty: 45 TABLET | Refills: 0 | Status: SHIPPED | OUTPATIENT
Start: 2020-10-26 | End: 2020-12-18 | Stop reason: SDUPTHER

## 2020-10-26 RX ORDER — CLONAZEPAM 0.5 MG/1
0.5 TABLET ORAL EVERY 12 HOURS PRN
Qty: 60 TABLET | Refills: 0 | Status: SHIPPED | OUTPATIENT
Start: 2020-10-26 | End: 2020-12-18 | Stop reason: SDUPTHER

## 2020-12-18 DIAGNOSIS — M25.519 SHOULDER PAIN, UNSPECIFIED CHRONICITY, UNSPECIFIED LATERALITY: ICD-10-CM

## 2020-12-18 DIAGNOSIS — F41.9 ANXIETY: ICD-10-CM

## 2020-12-21 RX ORDER — CLONAZEPAM 0.5 MG/1
0.5 TABLET ORAL EVERY 12 HOURS PRN
Qty: 60 TABLET | Refills: 0 | Status: SHIPPED | OUTPATIENT
Start: 2020-12-21 | End: 2021-04-02 | Stop reason: SDUPTHER

## 2020-12-21 RX ORDER — TRAMADOL HYDROCHLORIDE 50 MG/1
50 TABLET ORAL 3 TIMES DAILY PRN
Qty: 45 TABLET | Refills: 0 | Status: SHIPPED | OUTPATIENT
Start: 2020-12-21 | End: 2021-02-04 | Stop reason: SDUPTHER

## 2020-12-27 ENCOUNTER — OFFICE VISIT (OUTPATIENT)
Dept: URGENT CARE | Facility: CLINIC | Age: 40
End: 2020-12-27
Payer: COMMERCIAL

## 2020-12-27 VITALS
SYSTOLIC BLOOD PRESSURE: 145 MMHG | HEIGHT: 70 IN | WEIGHT: 245 LBS | RESPIRATION RATE: 18 BRPM | HEART RATE: 92 BPM | OXYGEN SATURATION: 97 % | BODY MASS INDEX: 35.07 KG/M2 | TEMPERATURE: 96.5 F | DIASTOLIC BLOOD PRESSURE: 70 MMHG

## 2020-12-27 DIAGNOSIS — R50.9 FEVER, UNSPECIFIED FEVER CAUSE: Primary | ICD-10-CM

## 2020-12-27 DIAGNOSIS — M10.9 PODAGRA: ICD-10-CM

## 2020-12-27 PROCEDURE — U0003 INFECTIOUS AGENT DETECTION BY NUCLEIC ACID (DNA OR RNA); SEVERE ACUTE RESPIRATORY SYNDROME CORONAVIRUS 2 (SARS-COV-2) (CORONAVIRUS DISEASE [COVID-19]), AMPLIFIED PROBE TECHNIQUE, MAKING USE OF HIGH THROUGHPUT TECHNOLOGIES AS DESCRIBED BY CMS-2020-01-R: HCPCS | Performed by: PHYSICIAN ASSISTANT

## 2020-12-27 PROCEDURE — 99213 OFFICE O/P EST LOW 20 MIN: CPT | Performed by: PHYSICIAN ASSISTANT

## 2020-12-27 RX ORDER — INDOMETHACIN 50 MG/1
50 CAPSULE ORAL 2 TIMES DAILY WITH MEALS
COMMUNITY

## 2020-12-28 RX ORDER — INDOMETHACIN 50 MG/1
50 CAPSULE ORAL 2 TIMES DAILY WITH MEALS
Qty: 28 CAPSULE | Refills: 0 | Status: SHIPPED | OUTPATIENT
Start: 2020-12-28 | End: 2021-07-02 | Stop reason: SDUPTHER

## 2020-12-29 ENCOUNTER — TELEPHONE (OUTPATIENT)
Dept: FAMILY MEDICINE CLINIC | Facility: CLINIC | Age: 40
End: 2020-12-29

## 2020-12-29 LAB — SARS-COV-2 RNA SPEC QL NAA+PROBE: DETECTED

## 2020-12-30 ENCOUNTER — TELEMEDICINE (OUTPATIENT)
Dept: FAMILY MEDICINE CLINIC | Facility: CLINIC | Age: 40
End: 2020-12-30
Payer: COMMERCIAL

## 2020-12-30 VITALS — TEMPERATURE: 97.8 F

## 2020-12-30 DIAGNOSIS — U07.1 COVID-19: Primary | ICD-10-CM

## 2020-12-30 PROCEDURE — 99213 OFFICE O/P EST LOW 20 MIN: CPT | Performed by: PHYSICIAN ASSISTANT

## 2020-12-31 ENCOUNTER — TELEMEDICINE (OUTPATIENT)
Dept: FAMILY MEDICINE CLINIC | Facility: CLINIC | Age: 40
End: 2020-12-31
Payer: COMMERCIAL

## 2020-12-31 VITALS — TEMPERATURE: 97.8 F

## 2020-12-31 DIAGNOSIS — U07.1 COVID-19: Primary | ICD-10-CM

## 2020-12-31 PROCEDURE — 99213 OFFICE O/P EST LOW 20 MIN: CPT | Performed by: PHYSICIAN ASSISTANT

## 2021-01-04 ENCOUNTER — TELEMEDICINE (OUTPATIENT)
Dept: FAMILY MEDICINE CLINIC | Facility: CLINIC | Age: 41
End: 2021-01-04
Payer: COMMERCIAL

## 2021-01-04 VITALS — TEMPERATURE: 97.8 F

## 2021-01-04 DIAGNOSIS — U07.1 COVID-19: Primary | ICD-10-CM

## 2021-01-04 PROCEDURE — 99213 OFFICE O/P EST LOW 20 MIN: CPT | Performed by: PHYSICIAN ASSISTANT

## 2021-01-04 NOTE — PROGRESS NOTES
COVID-19 Virtual Visit     Assessment/Plan:    Problem List Items Addressed This Visit        Other    COVID-19 - Primary         Disposition:     I recommended continued isolation until at least 24 hours have passed since recovery defined as resolution of fever without the use of fever-reducing medications and improvement in respiratory symptoms (e g , cough, shortness of breath) AND 10 days have passed since onset of symptoms  Sx day 12 covid day 8, lingering fatigue, aches, dizziness upon standing, nausea  Supportive care discussed, regular follow ups to continue, no SOB or objective fevers  I have spent 15 minutes directly with the patient  Encounter provider Kerwin Hart PA-C    Provider located at [facility] [address]  [address] [place] [phone]    Recent Visits  Date Type Provider Dept   12/31/20 Telemedicine [facility]   12/30/20 [facility]medicine [facility]   12/29/20 Telephone [facility]   Showing recent visits within past 7 days and meeting all other requirements     Today's Visits  Date Type Provider Dept   01/04/21 Telemedicine [facility]   Showing today's visits and meeting all other requirements     Future Appointments  No visits were found meeting these conditions  Showing future appointments within next 150 days and meeting all other requirements        Patient agrees to participate in a virtual check in via telephone or video visit instead of presenting to the office to address urgent/immediate medical needs  Patient is aware this is a billable service  After connecting through Telephone, the patient was identified by name and date of birth  Candido Stewart was informed that this was a telemedicine visit and that the exam was being conducted confidentially over secure lines  My office door was closed   No one else was in the room  Hannah Hagen acknowledged consent and understanding of privacy and security of the telemedicine visit  I informed the patient that I have reviewed his record in Epic and presented the opportunity for him to ask any questions regarding the visit today  The patient agreed to participate  It was my intent to perform this visit via video technology but the patient was not able to do a video connection so the visit was completed via audio telephone only  Subjective:   Hannah Hagen is a 45 y o  male who has been screened for COVID-19  Symptom change since last report: improving  Patient is asymptomatic  Date of symptom onset: 12/23/2020    Patient's symptoms include fatigue, anosmia, loss of taste, nausea and myalgias  Patient denies fever, chills, malaise, congestion, rhinorrhea, sore throat, cough, shortness of breath, chest tightness, abdominal pain, vomiting, diarrhea and headaches  Katherine Hdez has been staying home and has isolated themselves in his home  He is taking care to not share personal items and is cleaning all surfaces that are touched often, like counters, tabletops, and doorknobs using household cleaning sprays or wipes  He is wearing a mask when he leaves his room  Sx day 12 covid day 8, ongoing fatigue, malaise, aches, dizziness with standing  Pt felt tired after shoveling snow yesterday  No SOB or fevers       Lab Results   Component Value Date    SARSCOV2 Detected (A) 12/27/2020     Past Medical History:   Diagnosis Date    Anxiety     Concussion     Pyloric stenosis      Past Surgical History:   Procedure Laterality Date    HERNIA REPAIR      SHOULDER ARTHROSCOPY Right     SPINAL FUSION       Current Outpatient Medications   Medication Sig Dispense Refill    clonazePAM (KlonoPIN) 0 5 mg tablet Take 1 tablet (0 5 mg total) by mouth every 12 (twelve) hours as needed for anxiety 60 tablet 0    cyclobenzaprine (FLEXERIL) 10 mg tablet cyclobenzaprine 10 mg tablet      indomethacin (INDOCIN) 50 mg capsule Take 50 mg by mouth 2 (two) times a day with meals      indomethacin (INDOCIN) 50 mg capsule Take 1 capsule (50 mg total) by mouth 2 (two) times a day with meals for 14 days 28 capsule 0    traMADol (ULTRAM) 50 mg tablet Take 1 tablet (50 mg total) by mouth 3 (three) times a day as needed for moderate pain 45 tablet 0     No current facility-administered medications for this visit  Allergies   Allergen Reactions    Other Abdominal Pain     pollen    Pollen Extract Cough       Review of Systems   Constitutional: Positive for fatigue  Negative for chills and fever  HENT: Negative for congestion, rhinorrhea and sore throat  Respiratory: Negative for cough, chest tightness and shortness of breath  Gastrointestinal: Positive for nausea  Negative for abdominal pain, diarrhea and vomiting  Musculoskeletal: Positive for myalgias  Neurological: Positive for dizziness  Negative for headaches  Objective:    Vitals:    01/04/21 1440   Temp: 97 8 °F (36 6 °C)       Physical Exam  Vitals signs and nursing note reviewed  Constitutional:       General: He is not in acute distress  Pulmonary:      Effort: Pulmonary effort is normal  No respiratory distress  Neurological:      Mental Status: He is alert and oriented to person, place, and time  Psychiatric:         Mood and Affect: Mood normal          Behavior: Behavior normal        VIRTUAL VISIT DISCLAIMER    Keysha Segovia acknowledges that he has consented to an online visit or consultation  He understands that the online visit is based solely on information provided by him, and that, in the absence of a face-to-face physical evaluation by the physician, the diagnosis he receives is both limited and provisional in terms of accuracy and completeness  This is not intended to replace a full medical face-to-face evaluation by the physician  Keysha Segovia understands and accepts these terms

## 2021-01-06 ENCOUNTER — TELEMEDICINE (OUTPATIENT)
Dept: FAMILY MEDICINE CLINIC | Facility: CLINIC | Age: 41
End: 2021-01-06
Payer: COMMERCIAL

## 2021-01-06 DIAGNOSIS — U07.1 COVID-19: Primary | ICD-10-CM

## 2021-01-06 PROCEDURE — 99213 OFFICE O/P EST LOW 20 MIN: CPT | Performed by: PHYSICIAN ASSISTANT

## 2021-01-06 NOTE — PROGRESS NOTES
COVID-19 Virtual Visit     Assessment/Plan:    Problem List Items Addressed This Visit        Other    COVID-19 - Primary         Disposition:         Sx day 14 covid day 10, now meets criteria for lifting of quarantine, continue social precautions, follow up as needed  I have spent 10 minutes directly with the patient  Encounter provider Yamil Barragan PA-C    Provider located at [facility] [address]  [address] [place] [phone]    Recent Visits  Date Type Provider Dept   01/04/21 [facility]   12/31/20 [facility]   12/30/20 [facility]   Showing recent visits within past 7 days and meeting all other requirements     Today's Visits  Date Type Provider Dept   01/06/21 [facility]   Showing today's visits and meeting all other requirements     Future Appointments  No visits were found meeting these conditions  Showing future appointments within next 150 days and meeting all other requirements        Patient agrees to participate in a virtual check in via telephone or video visit instead of presenting to the office to address urgent/immediate medical needs  Patient is aware this is a billable service  After connecting through Telephone, the patient was identified by name and date of birth  Lauri Hazel was informed that this was a telemedicine visit and that the exam was being conducted confidentially over secure lines  My office door was closed  No one else was in the room  Lauri Hazel acknowledged consent and understanding of privacy and security of the telemedicine visit  I informed the patient that I have reviewed his record in Epic and presented the opportunity for him to ask any questions regarding the visit today  The patient agreed to participate      It was my intent to perform this visit via video technology but the patient was not able to do a video connection so the visit was completed via audio telephone only  Subjective:   Karol Sun is a 45 y o  male who has been screened for COVID-19  Symptom change since last report: resolving  Patient is asymptomatic  Date of symptom onset: 12/23/2020    Patient's symptoms include fatigue  Patient denies fever, chills, malaise, congestion, rhinorrhea, sore throat, anosmia, loss of taste, cough, shortness of breath, chest tightness, abdominal pain, nausea, vomiting, diarrhea, myalgias and headaches  Lety Segovia has been staying home and has isolated themselves in his home  He is taking care to not share personal items and is cleaning all surfaces that are touched often, like counters, tabletops, and doorknobs using household cleaning sprays or wipes  He is wearing a mask when he leaves his room  Sx day 14 covid day 10, sx improving, lingering fatigue and still slightly lightheaded with standing  No SOB, respiratory complaints, fevers  Improving each day       Lab Results   Component Value Date    SARSCOV2 Detected (A) 12/27/2020     Past Medical History:   Diagnosis Date    Anxiety     Concussion     Pyloric stenosis      Past Surgical History:   Procedure Laterality Date    HERNIA REPAIR      SHOULDER ARTHROSCOPY Right     SPINAL FUSION       Current Outpatient Medications   Medication Sig Dispense Refill    clonazePAM (KlonoPIN) 0 5 mg tablet Take 1 tablet (0 5 mg total) by mouth every 12 (twelve) hours as needed for anxiety 60 tablet 0    cyclobenzaprine (FLEXERIL) 10 mg tablet cyclobenzaprine 10 mg tablet      indomethacin (INDOCIN) 50 mg capsule Take 50 mg by mouth 2 (two) times a day with meals      indomethacin (INDOCIN) 50 mg capsule Take 1 capsule (50 mg total) by mouth 2 (two) times a day with meals for 14 days 28 capsule 0    traMADol (ULTRAM) 50 mg tablet Take 1 tablet (50 mg total) by mouth 3 (three) times a day as needed for moderate pain 45 tablet 0     No current facility-administered medications for this visit  Allergies   Allergen Reactions    Other Abdominal Pain     pollen    Pollen Extract Cough       Review of Systems   Constitutional: Positive for fatigue  Negative for chills and fever  HENT: Negative for congestion, rhinorrhea and sore throat  Respiratory: Negative for cough, chest tightness and shortness of breath  Gastrointestinal: Negative for abdominal pain, diarrhea, nausea and vomiting  Musculoskeletal: Negative for myalgias  Neurological: Negative for headaches  Objective: There were no vitals filed for this visit  Physical Exam  Vitals signs and nursing note reviewed  Constitutional:       General: He is not in acute distress  Pulmonary:      Effort: Pulmonary effort is normal  No respiratory distress  Neurological:      Mental Status: He is alert and oriented to person, place, and time  Psychiatric:         Mood and Affect: Mood normal          Behavior: Behavior normal        VIRTUAL VISIT DISCLAIMER    Maximilian Coyne acknowledges that he has consented to an online visit or consultation  He understands that the online visit is based solely on information provided by him, and that, in the absence of a face-to-face physical evaluation by the physician, the diagnosis he receives is both limited and provisional in terms of accuracy and completeness  This is not intended to replace a full medical face-to-face evaluation by the physician  Maximilian Coyne understands and accepts these terms

## 2021-01-06 NOTE — LETTER
January 6, 2021     Patient: Gerald Rivera   YOB: 1980   Date of Visit: 1/6/2021       To Whom it May Concern:    Victorino Steen is under my professional care  He was seen in my office on 1/6/2021  He may return to work on 1/9/2021  If you have any questions or concerns, please don't hesitate to call           Sincerely,          Tsering Shah PA-C        CC: No Recipients

## 2021-02-04 DIAGNOSIS — M25.519 SHOULDER PAIN, UNSPECIFIED CHRONICITY, UNSPECIFIED LATERALITY: ICD-10-CM

## 2021-02-05 RX ORDER — TRAMADOL HYDROCHLORIDE 50 MG/1
50 TABLET ORAL 3 TIMES DAILY PRN
Qty: 45 TABLET | Refills: 0 | Status: SHIPPED | OUTPATIENT
Start: 2021-02-05 | End: 2021-04-02 | Stop reason: SDUPTHER

## 2021-02-21 PROBLEM — Z13.220 SCREENING FOR LIPID DISORDERS: Status: RESOLVED | Noted: 2019-12-12 | Resolved: 2021-02-21

## 2021-02-21 PROBLEM — U07.1 COVID-19: Status: RESOLVED | Noted: 2020-12-31 | Resolved: 2021-02-21

## 2021-02-22 ENCOUNTER — OFFICE VISIT (OUTPATIENT)
Dept: FAMILY MEDICINE CLINIC | Facility: CLINIC | Age: 41
End: 2021-02-22
Payer: COMMERCIAL

## 2021-02-22 VITALS
OXYGEN SATURATION: 98 % | SYSTOLIC BLOOD PRESSURE: 144 MMHG | HEART RATE: 86 BPM | WEIGHT: 248.8 LBS | BODY MASS INDEX: 35.62 KG/M2 | DIASTOLIC BLOOD PRESSURE: 72 MMHG | RESPIRATION RATE: 18 BRPM | TEMPERATURE: 97.8 F | HEIGHT: 70 IN

## 2021-02-22 DIAGNOSIS — Z13.220 SCREENING FOR LIPID DISORDERS: ICD-10-CM

## 2021-02-22 DIAGNOSIS — M25.511 CHRONIC RIGHT SHOULDER PAIN: ICD-10-CM

## 2021-02-22 DIAGNOSIS — E66.09 CLASS 2 OBESITY DUE TO EXCESS CALORIES WITHOUT SERIOUS COMORBIDITY WITH BODY MASS INDEX (BMI) OF 35.0 TO 35.9 IN ADULT: ICD-10-CM

## 2021-02-22 DIAGNOSIS — G89.29 CHRONIC RIGHT SHOULDER PAIN: ICD-10-CM

## 2021-02-22 DIAGNOSIS — Z79.899 CONTROLLED SUBSTANCE AGREEMENT SIGNED: ICD-10-CM

## 2021-02-22 DIAGNOSIS — F90.1 ATTENTION-DEFICIT HYPERACTIVITY DISORDER, PREDOMINANTLY HYPERACTIVE TYPE: Primary | ICD-10-CM

## 2021-02-22 DIAGNOSIS — M10.072 ACUTE IDIOPATHIC GOUT OF LEFT FOOT: ICD-10-CM

## 2021-02-22 DIAGNOSIS — Z98.890 S/P SHOULDER SURGERY: ICD-10-CM

## 2021-02-22 DIAGNOSIS — F40.10 SOCIAL ANXIETY DISORDER: ICD-10-CM

## 2021-02-22 PROBLEM — L72.12 TRICHILEMMAL CYST: Status: RESOLVED | Noted: 2019-03-18 | Resolved: 2021-02-22

## 2021-02-22 PROCEDURE — 99214 OFFICE O/P EST MOD 30 MIN: CPT | Performed by: NURSE PRACTITIONER

## 2021-02-22 PROCEDURE — 3008F BODY MASS INDEX DOCD: CPT | Performed by: NURSE PRACTITIONER

## 2021-02-22 NOTE — PROGRESS NOTES
Assessment/Plan:           Problem List Items Addressed This Visit        Musculoskeletal and Integument    Acute idiopathic gout of left foot     Uric acid ordered to update          Relevant Orders    Comprehensive metabolic panel    Uric acid       Other    Attention-deficit hyperactivity disorder, predominantly hyperactive type - Primary    Relevant Orders    Comprehensive metabolic panel    Social anxiety disorder    Relevant Orders    Comprehensive metabolic panel    Class 2 obesity due to excess calories without serious comorbidity with body mass index (BMI) of 35 0 to 35 9 in adult    Chronic right shoulder pain     Patient taking the tramadol prn for his shoulder pain          S/P shoulder surgery    Controlled substance agreement signed     Agreement updated RUDS obtained           Other Visit Diagnoses     Screening for lipid disorders        Relevant Orders    Lipid Panel with Direct LDL reflex            Subjective:      Patient ID: Vamsi Ibarra is a 45 y o  male  Patient here today for his check up and to review his medications  Patient has noticed that he has had more gout flare ups since having COVID in December 2020  Patient when he has a gout flare left foot and knee and took tramadol and indomethacin for the gout  Patient gout flares in the left foot and happening every two weeks when he feels that it is flaring up taking the indomethacin and black cherry juice  About a month ago had significant lower back pain lasted less than a week and then went away  Shoulder pains in the right shoulder coming and going and is managed with the tramadol  Patient is doing well with the Clonazepam and is taking prn  BMI Counseling: Body mass index is 35 7 kg/m²   The BMI is above normal  Nutrition recommendations include decreasing portion sizes, encouraging healthy choices of fruits and vegetables, decreasing fast food intake, consuming healthier snacks, limiting drinks that contain sugar, moderation in carbohydrate intake, increasing intake of lean protein, reducing intake of saturated and trans fat and reducing intake of cholesterol  Exercise recommendations include moderate physical activity 150 minutes/week  Patient referred to PCP due to patient being overweight  Tobacco Cessation Counseling: Tobacco cessation counseling was provided  The patient is sincerely urged to quit consumption of tobacco  He is not ready to quit tobacco      The following portions of the patient's history were reviewed and updated as appropriate:   He  has a past medical history of Anxiety, Concussion, and Pyloric stenosis  He   Patient Active Problem List    Diagnosis Date Noted    Acute idiopathic gout of left foot 02/22/2021    Class 2 obesity due to excess calories without serious comorbidity with body mass index (BMI) of 35 0 to 35 9 in adult 12/12/2019    Chronic right shoulder pain 12/12/2019    S/P shoulder surgery 12/12/2019    Controlled substance agreement signed 12/12/2019    Attention-deficit hyperactivity disorder, predominantly hyperactive type 06/06/2017    Social anxiety disorder 06/06/2017     He  has a past surgical history that includes Spinal fusion; Shoulder arthroscopy (Right); and Hernia repair  His family history includes Lung cancer in his father; Other in his father; Sjogren's syndrome in his mother  He  reports that he has been smoking cigars  He has never used smokeless tobacco  He reports current alcohol use of about 7 0 standard drinks of alcohol per week  He reports that he does not use drugs    Current Outpatient Medications   Medication Sig Dispense Refill    clonazePAM (KlonoPIN) 0 5 mg tablet Take 1 tablet (0 5 mg total) by mouth every 12 (twelve) hours as needed for anxiety 60 tablet 0    indomethacin (INDOCIN) 50 mg capsule Take 50 mg by mouth 2 (two) times a day with meals      traMADol (ULTRAM) 50 mg tablet Take 1 tablet (50 mg total) by mouth 3 (three) times a day as needed for moderate pain 45 tablet 0    cyclobenzaprine (FLEXERIL) 10 mg tablet cyclobenzaprine 10 mg tablet      indomethacin (INDOCIN) 50 mg capsule Take 1 capsule (50 mg total) by mouth 2 (two) times a day with meals for 14 days 28 capsule 0     No current facility-administered medications for this visit  He is allergic to other and pollen extract       Review of Systems   Constitutional: Negative for activity change, appetite change, chills, diaphoresis, fatigue, fever and unexpected weight change  HENT: Negative for congestion, ear pain, hearing loss, postnasal drip, sinus pressure, sinus pain, sneezing and sore throat  Eyes: Negative for pain, redness and visual disturbance  Respiratory: Negative for cough and shortness of breath  Cardiovascular: Negative for chest pain and leg swelling  Gastrointestinal: Negative for abdominal pain, diarrhea, nausea and vomiting  Endocrine: Negative  Genitourinary: Negative  Musculoskeletal: Positive for arthralgias  Skin: Negative  Allergic/Immunologic: Negative  Neurological: Negative for dizziness and light-headedness  Hematological: Negative  Psychiatric/Behavioral: Negative for behavioral problems and dysphoric mood  Objective:      /72 (BP Location: Left arm, Patient Position: Sitting, Cuff Size: Adult)   Pulse 86   Temp 97 8 °F (36 6 °C)   Resp 18   Ht 5' 10" (1 778 m)   Wt 113 kg (248 lb 12 8 oz)   SpO2 98%   BMI 35 70 kg/m²          Physical Exam  Vitals signs reviewed  Constitutional:       General: He is not in acute distress  Appearance: He is well-developed  HENT:      Head: Normocephalic and atraumatic  Nose: Nose normal       Mouth/Throat:      Mouth: Mucous membranes are moist    Eyes:      Pupils: Pupils are equal, round, and reactive to light  Neck:      Musculoskeletal: Normal range of motion  Thyroid: No thyromegaly  Cardiovascular:      Rate and Rhythm: Normal rate and regular rhythm  Heart sounds: Normal heart sounds  No murmur  Pulmonary:      Effort: Pulmonary effort is normal  No respiratory distress  Breath sounds: Normal breath sounds  No wheezing  Abdominal:      General: Bowel sounds are normal       Palpations: Abdomen is soft  Musculoskeletal: Normal range of motion  Skin:     General: Skin is warm and dry  Neurological:      General: No focal deficit present  Mental Status: He is alert and oriented to person, place, and time  Psychiatric:         Mood and Affect: Mood normal          Thought Content:  Thought content normal          Judgment: Judgment normal

## 2021-03-23 ENCOUNTER — APPOINTMENT (OUTPATIENT)
Dept: LAB | Facility: CLINIC | Age: 41
End: 2021-03-23
Payer: COMMERCIAL

## 2021-03-23 DIAGNOSIS — F40.10 SOCIAL ANXIETY DISORDER: ICD-10-CM

## 2021-03-23 DIAGNOSIS — M10.072 ACUTE IDIOPATHIC GOUT OF LEFT FOOT: ICD-10-CM

## 2021-03-23 DIAGNOSIS — Z13.220 SCREENING FOR LIPID DISORDERS: ICD-10-CM

## 2021-03-23 DIAGNOSIS — F90.1 ATTENTION-DEFICIT HYPERACTIVITY DISORDER, PREDOMINANTLY HYPERACTIVE TYPE: ICD-10-CM

## 2021-03-23 LAB
ALBUMIN SERPL BCP-MCNC: 4.1 G/DL (ref 3.5–5)
ALP SERPL-CCNC: 50 U/L (ref 46–116)
ALT SERPL W P-5'-P-CCNC: 43 U/L (ref 12–78)
ANION GAP SERPL CALCULATED.3IONS-SCNC: 5 MMOL/L (ref 4–13)
AST SERPL W P-5'-P-CCNC: 22 U/L (ref 5–45)
BILIRUB SERPL-MCNC: 1.03 MG/DL (ref 0.2–1)
BUN SERPL-MCNC: 17 MG/DL (ref 5–25)
CALCIUM SERPL-MCNC: 9.4 MG/DL (ref 8.3–10.1)
CHLORIDE SERPL-SCNC: 105 MMOL/L (ref 100–108)
CHOLEST SERPL-MCNC: 172 MG/DL (ref 50–200)
CO2 SERPL-SCNC: 29 MMOL/L (ref 21–32)
CREAT SERPL-MCNC: 1.09 MG/DL (ref 0.6–1.3)
GFR SERPL CREATININE-BSD FRML MDRD: 85 ML/MIN/1.73SQ M
GLUCOSE P FAST SERPL-MCNC: 82 MG/DL (ref 65–99)
HDLC SERPL-MCNC: 41 MG/DL
LDLC SERPL CALC-MCNC: 101 MG/DL (ref 0–100)
POTASSIUM SERPL-SCNC: 3.8 MMOL/L (ref 3.5–5.3)
PROT SERPL-MCNC: 7.9 G/DL (ref 6.4–8.2)
SODIUM SERPL-SCNC: 139 MMOL/L (ref 136–145)
TRIGL SERPL-MCNC: 148 MG/DL
TSH SERPL DL<=0.05 MIU/L-ACNC: 2.05 UIU/ML (ref 0.36–3.74)
URATE SERPL-MCNC: 7.9 MG/DL (ref 4.2–8)

## 2021-03-23 PROCEDURE — 84550 ASSAY OF BLOOD/URIC ACID: CPT

## 2021-03-23 PROCEDURE — 84443 ASSAY THYROID STIM HORMONE: CPT

## 2021-03-23 PROCEDURE — 36415 COLL VENOUS BLD VENIPUNCTURE: CPT

## 2021-03-23 PROCEDURE — 80053 COMPREHEN METABOLIC PANEL: CPT

## 2021-03-23 PROCEDURE — 80061 LIPID PANEL: CPT

## 2021-03-24 ENCOUNTER — TELEPHONE (OUTPATIENT)
Dept: FAMILY MEDICINE CLINIC | Facility: CLINIC | Age: 41
End: 2021-03-24

## 2021-03-24 DIAGNOSIS — R53.82 CHRONIC FATIGUE: Primary | ICD-10-CM

## 2021-04-02 DIAGNOSIS — M25.519 SHOULDER PAIN, UNSPECIFIED CHRONICITY, UNSPECIFIED LATERALITY: ICD-10-CM

## 2021-04-02 DIAGNOSIS — F41.9 ANXIETY: ICD-10-CM

## 2021-04-05 RX ORDER — CLONAZEPAM 0.5 MG/1
0.5 TABLET ORAL EVERY 12 HOURS PRN
Qty: 60 TABLET | Refills: 0 | Status: SHIPPED | OUTPATIENT
Start: 2021-04-05 | End: 2021-08-11 | Stop reason: SDUPTHER

## 2021-04-05 RX ORDER — TRAMADOL HYDROCHLORIDE 50 MG/1
50 TABLET ORAL 3 TIMES DAILY PRN
Qty: 45 TABLET | Refills: 0 | Status: SHIPPED | OUTPATIENT
Start: 2021-04-05 | End: 2021-05-17 | Stop reason: SDUPTHER

## 2021-04-06 ENCOUNTER — APPOINTMENT (OUTPATIENT)
Dept: LAB | Facility: CLINIC | Age: 41
End: 2021-04-06
Payer: COMMERCIAL

## 2021-04-06 DIAGNOSIS — R53.82 CHRONIC FATIGUE: ICD-10-CM

## 2021-04-06 LAB
BASOPHILS # BLD AUTO: 0.05 THOUSANDS/ΜL (ref 0–0.1)
BASOPHILS NFR BLD AUTO: 1 % (ref 0–1)
EOSINOPHIL # BLD AUTO: 0.12 THOUSAND/ΜL (ref 0–0.61)
EOSINOPHIL NFR BLD AUTO: 2 % (ref 0–6)
ERYTHROCYTE [DISTWIDTH] IN BLOOD BY AUTOMATED COUNT: 12.4 % (ref 11.6–15.1)
HCT VFR BLD AUTO: 50.4 % (ref 36.5–49.3)
HGB BLD-MCNC: 17.1 G/DL (ref 12–17)
IMM GRANULOCYTES # BLD AUTO: 0.04 THOUSAND/UL (ref 0–0.2)
IMM GRANULOCYTES NFR BLD AUTO: 1 % (ref 0–2)
LYMPHOCYTES # BLD AUTO: 2.3 THOUSANDS/ΜL (ref 0.6–4.47)
LYMPHOCYTES NFR BLD AUTO: 34 % (ref 14–44)
MCH RBC QN AUTO: 29.5 PG (ref 26.8–34.3)
MCHC RBC AUTO-ENTMCNC: 33.9 G/DL (ref 31.4–37.4)
MCV RBC AUTO: 87 FL (ref 82–98)
MONOCYTES # BLD AUTO: 0.67 THOUSAND/ΜL (ref 0.17–1.22)
MONOCYTES NFR BLD AUTO: 10 % (ref 4–12)
NEUTROPHILS # BLD AUTO: 3.62 THOUSANDS/ΜL (ref 1.85–7.62)
NEUTS SEG NFR BLD AUTO: 52 % (ref 43–75)
NRBC BLD AUTO-RTO: 0 /100 WBCS
PLATELET # BLD AUTO: 268 THOUSANDS/UL (ref 149–390)
PMV BLD AUTO: 11 FL (ref 8.9–12.7)
RBC # BLD AUTO: 5.79 MILLION/UL (ref 3.88–5.62)
WBC # BLD AUTO: 6.8 THOUSAND/UL (ref 4.31–10.16)

## 2021-04-06 PROCEDURE — 85025 COMPLETE CBC W/AUTO DIFF WBC: CPT

## 2021-04-06 PROCEDURE — 36415 COLL VENOUS BLD VENIPUNCTURE: CPT

## 2021-04-08 DIAGNOSIS — R06.83 SNORING: Primary | ICD-10-CM

## 2021-04-19 ENCOUNTER — TELEPHONE (OUTPATIENT)
Dept: FAMILY MEDICINE CLINIC | Facility: CLINIC | Age: 41
End: 2021-04-19

## 2021-05-17 DIAGNOSIS — M25.519 SHOULDER PAIN, UNSPECIFIED CHRONICITY, UNSPECIFIED LATERALITY: ICD-10-CM

## 2021-05-17 RX ORDER — TRAMADOL HYDROCHLORIDE 50 MG/1
50 TABLET ORAL 3 TIMES DAILY PRN
Qty: 45 TABLET | Refills: 0 | Status: SHIPPED | OUTPATIENT
Start: 2021-05-17 | End: 2021-07-02 | Stop reason: SDUPTHER

## 2021-08-11 DIAGNOSIS — F41.9 ANXIETY: ICD-10-CM

## 2021-08-12 DIAGNOSIS — M25.519 SHOULDER PAIN, UNSPECIFIED CHRONICITY, UNSPECIFIED LATERALITY: ICD-10-CM

## 2021-08-12 RX ORDER — CLONAZEPAM 0.5 MG/1
0.5 TABLET ORAL EVERY 12 HOURS PRN
Qty: 60 TABLET | Refills: 0 | Status: SHIPPED | OUTPATIENT
Start: 2021-08-12 | End: 2021-11-24 | Stop reason: SDUPTHER

## 2021-08-12 RX ORDER — TRAMADOL HYDROCHLORIDE 50 MG/1
50 TABLET ORAL 3 TIMES DAILY PRN
Qty: 45 TABLET | Refills: 0 | Status: SHIPPED | OUTPATIENT
Start: 2021-08-12 | End: 2021-09-17 | Stop reason: SDUPTHER

## 2021-08-31 ENCOUNTER — OFFICE VISIT (OUTPATIENT)
Dept: FAMILY MEDICINE CLINIC | Facility: CLINIC | Age: 41
End: 2021-08-31
Payer: COMMERCIAL

## 2021-08-31 VITALS
OXYGEN SATURATION: 96 % | DIASTOLIC BLOOD PRESSURE: 88 MMHG | RESPIRATION RATE: 18 BRPM | SYSTOLIC BLOOD PRESSURE: 132 MMHG | TEMPERATURE: 97.5 F | HEIGHT: 70 IN | WEIGHT: 256 LBS | HEART RATE: 90 BPM | BODY MASS INDEX: 36.65 KG/M2

## 2021-08-31 DIAGNOSIS — Z79.899 CONTROLLED SUBSTANCE AGREEMENT SIGNED: ICD-10-CM

## 2021-08-31 DIAGNOSIS — Z00.00 ANNUAL PHYSICAL EXAM: Primary | ICD-10-CM

## 2021-08-31 DIAGNOSIS — M25.511 CHRONIC RIGHT SHOULDER PAIN: ICD-10-CM

## 2021-08-31 DIAGNOSIS — F40.10 SOCIAL ANXIETY DISORDER: ICD-10-CM

## 2021-08-31 DIAGNOSIS — G89.29 CHRONIC RIGHT SHOULDER PAIN: ICD-10-CM

## 2021-08-31 DIAGNOSIS — H60.541 ECZEMA OF RIGHT EXTERNAL EAR: ICD-10-CM

## 2021-08-31 DIAGNOSIS — R53.82 CHRONIC FATIGUE: ICD-10-CM

## 2021-08-31 DIAGNOSIS — Z13.220 LIPID SCREENING: ICD-10-CM

## 2021-08-31 DIAGNOSIS — M10.072 ACUTE IDIOPATHIC GOUT OF LEFT FOOT: ICD-10-CM

## 2021-08-31 PROCEDURE — 3725F SCREEN DEPRESSION PERFORMED: CPT | Performed by: NURSE PRACTITIONER

## 2021-08-31 PROCEDURE — 4004F PT TOBACCO SCREEN RCVD TLK: CPT | Performed by: NURSE PRACTITIONER

## 2021-08-31 PROCEDURE — 3008F BODY MASS INDEX DOCD: CPT | Performed by: NURSE PRACTITIONER

## 2021-08-31 PROCEDURE — 99396 PREV VISIT EST AGE 40-64: CPT | Performed by: NURSE PRACTITIONER

## 2021-08-31 RX ORDER — CLOBETASOL PROPIONATE 0.5 MG/G
OINTMENT TOPICAL 2 TIMES DAILY
Qty: 30 G | Refills: 0 | Status: SHIPPED | OUTPATIENT
Start: 2021-08-31

## 2021-08-31 NOTE — PROGRESS NOTES
[id number] [address] [place] [address]    NAME: Clotilde Medina  AGE: 37 y o  SEX: male  : 1980     DATE: 2021     Assessment and Plan:     Problem List Items Addressed This Visit        Nervous and Auditory    Eczema of right external ear    Relevant Medications    clobetasol (TEMOVATE) 0 05 % ointment       Musculoskeletal and Integument    Acute idiopathic gout of left foot     Patient has no acute flare ups          Relevant Orders    Comprehensive metabolic panel    CBC and differential    Uric acid       Other    Social anxiety disorder     Patient is taking the Clonazepam prn          Chronic right shoulder pain     Patient is using the prn Tramadol 50 mg          Controlled substance agreement signed    Annual physical exam - Primary    Chronic fatigue    Relevant Orders    Comprehensive metabolic panel    CBC and differential    Lipid screening    Relevant Orders    Lipid Panel with Direct LDL reflex          Immunizations and preventive care screenings were discussed with patient today  Appropriate education was printed on patient's after visit summary  Counseling:  Injury prevention: discussed safety/seat belts, safety helmets, smoke detectors, carbon dioxide detectors, and smoking near bedding or upholstery  · Exercise: the importance of regular exercise/physical activity was discussed  Recommend exercise 3-5 times per week for at least 30 minutes  Return in 1 year (on 2022)  Chief Complaint:     Chief Complaint   Patient presents with    Follow-up     6 month      History of Present Illness:     Adult Annual Physical   Patient here for a comprehensive physical exam  The patient reports that his allergies are flaring up and using the nedi pot and sudafed Patient is still having problems with his smell with COVID  Diet and Physical Activity  · Diet/Nutrition: well balanced diet  · Exercise: no formal exercise  Depression Screening  PHQ-9 Depression Screening    PHQ-9:   Frequency of the following problems over the past two weeks:      Little interest or pleasure in doing things: 0 - not at all  Feeling down, depressed, or hopeless: 0 - not at all  PHQ-2 Score: 0       General Health  · Sleep: sleeps well and gets 7-8 hours of sleep on average  Patient does have some fatigue in day   · Hearing: normal - bilateral   · Vision: no vision problems and goes for regular eye exams  · Dental: regular dental visits and brushes teeth twice daily   Health  · Symptoms include: none     Review of Systems:     Review of Systems   Constitutional: Negative for activity change, appetite change, chills, diaphoresis, fatigue, fever and unexpected weight change  HENT: Positive for postnasal drip and rhinorrhea  Negative for congestion, ear pain, hearing loss, sinus pressure, sinus pain, sneezing and sore throat  Eyes: Negative for pain, redness and visual disturbance  Respiratory: Negative for cough and shortness of breath  Cardiovascular: Negative for chest pain and leg swelling  Gastrointestinal: Negative for abdominal pain, diarrhea, nausea and vomiting  Endocrine: Negative  Genitourinary: Negative  Musculoskeletal: Positive for arthralgias  Skin: Negative  Allergic/Immunologic: Negative  Neurological: Negative for dizziness and light-headedness  Psychiatric/Behavioral: Negative for behavioral problems and dysphoric mood  The patient is nervous/anxious         Past Medical History:     Past Medical History:   Diagnosis Date    Anxiety     Concussion     Pyloric stenosis       Past Surgical History:     Past Surgical History:   Procedure Laterality Date    HERNIA REPAIR      SHOULDER ARTHROSCOPY Right     SPINAL FUSION        Family History:     Family History   Problem Relation Age of Onset    Lung cancer Father     Other Father         Back disor    Sjogren's syndrome Mother       Social History:     Social History     Socioeconomic History    Marital status: Single     Spouse name: None    Number of children: None    Years of education: None    Highest education level: None   Occupational History    None   Tobacco Use    Smoking status: Light Tobacco Smoker     Types: Cigars    Smokeless tobacco: Never Used   Vaping Use    Vaping Use: Never used   Substance and Sexual Activity    Alcohol use: Yes     Alcohol/week: 7 0 standard drinks     Types: 7 Cans of beer per week    Drug use: Never    Sexual activity: None   Other Topics Concern    None   Social History Narrative    Daily alcohol use - As per [employer]    Full-time employment    Lives with parents     Social Determinants of Health     Financial Resource Strain:     Difficulty of Paying Living Expenses:    Food Insecurity:     Worried About [address] in the Last Year:     [address] [employer] in the Last Year:    Transportation Needs:     Lack of Transportation (Medical):      Lack of Transportation (Non-Medical):    Physical Activity:     Days of Exercise per Week:     Minutes of Exercise per Session:    Stress:     Feeling of Stress :    Social Connections:     Frequency of Communication with Friends and Family:     Frequency of Social Gatherings with Friends and Family:     Attends Sabianist Services:     Active Member of Clubs or Organizations:     Attends Club or Organization Meetings:     Marital Status:    Intimate Partner Violence:     Fear of Current or Ex-Partner:     Emotionally Abused:     Physically Abused:     Sexually Abused:       Current Medications:     Current Outpatient Medications   Medication Sig Dispense Refill    clonazePAM (KlonoPIN) 0 5 mg tablet Take 1 tablet (0 5 mg total) by mouth every 12 (twelve) hours as needed for anxiety 60 tablet 0    cyclobenzaprine (FLEXERIL) 10 mg tablet cyclobenzaprine 10 mg tablet      indomethacin (INDOCIN) 50 mg capsule Take 50 mg by mouth 2 (two) times a day with meals      traMADol (ULTRAM) 50 mg tablet Take 1 tablet (50 mg total) by mouth 3 (three) times a day as needed for moderate pain 45 tablet 0    clobetasol (TEMOVATE) 0 05 % ointment Apply topically 2 (two) times a day 30 g 0    indomethacin (INDOCIN) 50 mg capsule Take 1 capsule (50 mg total) by mouth 2 (two) times a day with meals for 14 days 28 capsule 0     No current facility-administered medications for this visit  Allergies: Allergies   Allergen Reactions    Other Abdominal Pain     pollen    Pollen Extract Cough      Physical Exam:     /88 (BP Location: Left arm, Patient Position: Sitting)   Pulse 90   Temp 97 5 °F (36 4 °C)   Resp 18   Ht 5' 10" (1 778 m)   Wt 116 kg (256 lb)   SpO2 96%   BMI 36 73 kg/m²     Physical Exam  Vitals and nursing note reviewed  Constitutional:       Appearance: He is well-developed  HENT:      Head: Normocephalic and atraumatic  Eyes:      Conjunctiva/sclera: Conjunctivae normal    Cardiovascular:      Rate and Rhythm: Normal rate and regular rhythm  Heart sounds: No murmur heard  Pulmonary:      Effort: Pulmonary effort is normal  No respiratory distress  Breath sounds: Normal breath sounds  Abdominal:      Palpations: Abdomen is soft  Tenderness: There is no abdominal tenderness  Musculoskeletal:      Cervical back: Neck supple  Skin:     General: Skin is warm and dry  Capillary Refill: Capillary refill takes less than 2 seconds  Neurological:      General: No focal deficit present  Mental Status: He is alert and oriented to person, place, and time  Psychiatric:         Mood and Affect: Mood is anxious           Behavior: Behavior normal           Delmy Shukla, [address]

## 2021-08-31 NOTE — PATIENT INSTRUCTIONS

## 2021-09-17 DIAGNOSIS — M25.519 SHOULDER PAIN, UNSPECIFIED CHRONICITY, UNSPECIFIED LATERALITY: ICD-10-CM

## 2021-09-17 RX ORDER — TRAMADOL HYDROCHLORIDE 50 MG/1
50 TABLET ORAL 3 TIMES DAILY PRN
Qty: 45 TABLET | Refills: 0 | Status: SHIPPED | OUTPATIENT
Start: 2021-09-17 | End: 2021-10-21 | Stop reason: SDUPTHER

## 2021-09-17 NOTE — TELEPHONE ENCOUNTER
----- Message from Juan M Navarrete sent at 9/17/2021 10:08 AM EDT -----  Regarding: Prescription Question  Contact: [phone]  Pooja Dennison,  I am requesting refill for Ultram   system will not let me request it through normal portal     Thanks  Brennan Cuevas

## 2021-10-21 DIAGNOSIS — M25.519 SHOULDER PAIN, UNSPECIFIED CHRONICITY, UNSPECIFIED LATERALITY: ICD-10-CM

## 2021-10-21 RX ORDER — TRAMADOL HYDROCHLORIDE 50 MG/1
50 TABLET ORAL 3 TIMES DAILY PRN
Qty: 45 TABLET | Refills: 0 | Status: SHIPPED | OUTPATIENT
Start: 2021-10-21 | End: 2021-11-24 | Stop reason: SDUPTHER

## 2021-11-24 DIAGNOSIS — F41.9 ANXIETY: ICD-10-CM

## 2021-11-24 DIAGNOSIS — M25.519 SHOULDER PAIN, UNSPECIFIED CHRONICITY, UNSPECIFIED LATERALITY: ICD-10-CM

## 2021-11-26 RX ORDER — TRAMADOL HYDROCHLORIDE 50 MG/1
50 TABLET ORAL 3 TIMES DAILY PRN
Qty: 45 TABLET | Refills: 0 | Status: SHIPPED | OUTPATIENT
Start: 2021-11-26 | End: 2022-01-04 | Stop reason: SDUPTHER

## 2021-11-26 RX ORDER — CLONAZEPAM 0.5 MG/1
0.5 TABLET ORAL EVERY 12 HOURS PRN
Qty: 60 TABLET | Refills: 0 | Status: SHIPPED | OUTPATIENT
Start: 2021-11-26 | End: 2021-12-01 | Stop reason: SDUPTHER

## 2021-12-01 DIAGNOSIS — F41.9 ANXIETY: ICD-10-CM

## 2021-12-02 RX ORDER — CLONAZEPAM 0.5 MG/1
0.5 TABLET ORAL EVERY 12 HOURS PRN
Qty: 60 TABLET | Refills: 0 | Status: SHIPPED | OUTPATIENT
Start: 2021-12-02 | End: 2022-01-26 | Stop reason: SDUPTHER

## 2022-01-04 DIAGNOSIS — M25.519 SHOULDER PAIN, UNSPECIFIED CHRONICITY, UNSPECIFIED LATERALITY: ICD-10-CM

## 2022-01-04 RX ORDER — TRAMADOL HYDROCHLORIDE 50 MG/1
50 TABLET ORAL 3 TIMES DAILY PRN
Qty: 45 TABLET | Refills: 0 | Status: SHIPPED | OUTPATIENT
Start: 2022-01-04 | End: 2022-02-09 | Stop reason: SDUPTHER

## 2022-01-19 ENCOUNTER — TRANSCRIBE ORDERS (OUTPATIENT)
Dept: FAMILY MEDICINE CLINIC | Facility: CLINIC | Age: 42
End: 2022-01-19

## 2022-01-19 DIAGNOSIS — Z20.822 COVID-19 RULED OUT: Primary | ICD-10-CM

## 2022-01-19 PROCEDURE — U0005 INFEC AGEN DETEC AMPLI PROBE: HCPCS | Performed by: NURSE PRACTITIONER

## 2022-01-19 PROCEDURE — U0003 INFECTIOUS AGENT DETECTION BY NUCLEIC ACID (DNA OR RNA); SEVERE ACUTE RESPIRATORY SYNDROME CORONAVIRUS 2 (SARS-COV-2) (CORONAVIRUS DISEASE [COVID-19]), AMPLIFIED PROBE TECHNIQUE, MAKING USE OF HIGH THROUGHPUT TECHNOLOGIES AS DESCRIBED BY CMS-2020-01-R: HCPCS | Performed by: NURSE PRACTITIONER

## 2022-01-20 ENCOUNTER — OFFICE VISIT (OUTPATIENT)
Dept: FAMILY MEDICINE CLINIC | Facility: CLINIC | Age: 42
End: 2022-01-20
Payer: COMMERCIAL

## 2022-01-20 VITALS
HEIGHT: 70 IN | HEART RATE: 85 BPM | SYSTOLIC BLOOD PRESSURE: 122 MMHG | OXYGEN SATURATION: 96 % | TEMPERATURE: 97.6 F | WEIGHT: 247 LBS | DIASTOLIC BLOOD PRESSURE: 81 MMHG | BODY MASS INDEX: 35.36 KG/M2

## 2022-01-20 DIAGNOSIS — J20.9 BRONCHITIS WITH BRONCHOSPASM: Primary | ICD-10-CM

## 2022-01-20 LAB — SARS-COV-2 RNA RESP QL NAA+PROBE: NEGATIVE

## 2022-01-20 PROCEDURE — 99213 OFFICE O/P EST LOW 20 MIN: CPT | Performed by: PHYSICIAN ASSISTANT

## 2022-01-20 PROCEDURE — 4004F PT TOBACCO SCREEN RCVD TLK: CPT | Performed by: PHYSICIAN ASSISTANT

## 2022-01-20 PROCEDURE — 3008F BODY MASS INDEX DOCD: CPT | Performed by: PHYSICIAN ASSISTANT

## 2022-01-20 RX ORDER — METHYLPREDNISOLONE 4 MG/1
TABLET ORAL
Qty: 21 EACH | Refills: 0 | Status: SHIPPED | OUTPATIENT
Start: 2022-01-20 | End: 2022-04-20 | Stop reason: ALTCHOICE

## 2022-01-20 RX ORDER — AZITHROMYCIN 250 MG/1
TABLET, FILM COATED ORAL
Qty: 6 TABLET | Refills: 0 | Status: SHIPPED | OUTPATIENT
Start: 2022-01-20 | End: 2022-01-25

## 2022-01-20 RX ORDER — ALBUTEROL SULFATE 90 UG/1
2 AEROSOL, METERED RESPIRATORY (INHALATION) EVERY 6 HOURS PRN
Qty: 6.7 G | Refills: 0 | Status: SHIPPED | OUTPATIENT
Start: 2022-01-20 | End: 2022-02-14

## 2022-01-20 NOTE — PROGRESS NOTES
Assessment/Plan:       Problem List Items Addressed This Visit     None      Visit Diagnoses     Bronchitis with bronchospasm    -  Primary    Relevant Medications    methylPREDNISolone 4 MG tablet therapy pack    albuterol (Proventil HFA) 90 mcg/act inhaler    azithromycin (Zithromax) 250 mg tablet        covid negative, exam is unremarkable, given chest tightness and subjective audible wheeze will taper medrol and provide albuterol prn  Azithromycin  Follow up prn     Subjective:      Patient ID: Casie Seaman is a 37 y o  male  Pt presents for 4 days of mild cough, wheezing, chest tightness and a feeling SOB  O2 96%  No fevers, chills  Covid testing was negative yesterday via PCR  No sore throat, sinus pain  Pt vaccinated against covid  The following portions of the patient's history were reviewed and updated as appropriate:   He  has a past medical history of Anxiety, Concussion, and Pyloric stenosis  He   Patient Active Problem List    Diagnosis Date Noted    Annual physical exam 08/31/2021    Eczema of right external ear 08/31/2021    Chronic fatigue 08/31/2021    Lipid screening 08/31/2021    Acute idiopathic gout of left foot 02/22/2021    Class 2 obesity due to excess calories without serious comorbidity with body mass index (BMI) of 35 0 to 35 9 in adult 12/12/2019    Chronic right shoulder pain 12/12/2019    S/P shoulder surgery 12/12/2019    Controlled substance agreement signed 12/12/2019    Attention-deficit hyperactivity disorder, predominantly hyperactive type 06/06/2017    Social anxiety disorder 06/06/2017     He  has a past surgical history that includes Spinal fusion; Shoulder arthroscopy (Right); and Hernia repair  His family history includes Lung cancer in his father; Other in his father; Sjogren's syndrome in his mother  He  reports that he has been smoking cigars   He has never used smokeless tobacco  He reports current alcohol use of about 7 0 standard drinks of alcohol per week  He reports that he does not use drugs  Current Outpatient Medications   Medication Sig Dispense Refill    albuterol (Proventil HFA) 90 mcg/act inhaler Inhale 2 puffs every 6 (six) hours as needed for wheezing 6 7 g 0    azithromycin (Zithromax) 250 mg tablet Take 2 tablets (500 mg total) by mouth daily for 1 day, THEN 1 tablet (250 mg total) daily for 4 days  6 tablet 0    clobetasol (TEMOVATE) 0 05 % ointment Apply topically 2 (two) times a day 30 g 0    clonazePAM (KlonoPIN) 0 5 mg tablet Take 1 tablet (0 5 mg total) by mouth every 12 (twelve) hours as needed for anxiety 60 tablet 0    cyclobenzaprine (FLEXERIL) 10 mg tablet cyclobenzaprine 10 mg tablet      indomethacin (INDOCIN) 50 mg capsule Take 50 mg by mouth 2 (two) times a day with meals      indomethacin (INDOCIN) 50 mg capsule Take 1 capsule (50 mg total) by mouth 2 (two) times a day with meals for 14 days 28 capsule 0    methylPREDNISolone 4 MG tablet therapy pack Use as directed on package 21 each 0    traMADol (ULTRAM) 50 mg tablet Take 1 tablet (50 mg total) by mouth 3 (three) times a day as needed for moderate pain 45 tablet 0     No current facility-administered medications for this visit       Current Outpatient Medications on File Prior to Visit   Medication Sig    clobetasol (TEMOVATE) 0 05 % ointment Apply topically 2 (two) times a day    clonazePAM (KlonoPIN) 0 5 mg tablet Take 1 tablet (0 5 mg total) by mouth every 12 (twelve) hours as needed for anxiety    cyclobenzaprine (FLEXERIL) 10 mg tablet cyclobenzaprine 10 mg tablet    indomethacin (INDOCIN) 50 mg capsule Take 50 mg by mouth 2 (two) times a day with meals    indomethacin (INDOCIN) 50 mg capsule Take 1 capsule (50 mg total) by mouth 2 (two) times a day with meals for 14 days    traMADol (ULTRAM) 50 mg tablet Take 1 tablet (50 mg total) by mouth 3 (three) times a day as needed for moderate pain     No current facility-administered medications on file prior to visit  He is allergic to other and pollen extract       Review of Systems   Constitutional: Negative for chills, fatigue and fever  HENT: Negative for congestion, ear pain, hearing loss, nosebleeds, postnasal drip, rhinorrhea, sinus pressure, sinus pain, sneezing and sore throat  Eyes: Negative for pain, discharge, itching and visual disturbance  Respiratory: Positive for cough, chest tightness, shortness of breath and wheezing  Cardiovascular: Negative for chest pain, palpitations and leg swelling  Gastrointestinal: Negative for abdominal pain, blood in stool, constipation, diarrhea, nausea and vomiting  Genitourinary: Negative for frequency and urgency  Neurological: Negative for dizziness, light-headedness and numbness  Objective:      /81   Pulse 85   Temp 97 6 °F (36 4 °C)   Ht 5' 10" (1 778 m)   Wt 112 kg (247 lb)   SpO2 96%   BMI 35 44 kg/m²          Physical Exam  Vitals and nursing note reviewed  Constitutional:       General: He is not in acute distress  Appearance: Normal appearance  HENT:      Head: Normocephalic and atraumatic  Right Ear: Tympanic membrane, ear canal and external ear normal  There is no impacted cerumen  Left Ear: Tympanic membrane, ear canal and external ear normal  There is no impacted cerumen  Nose: Nose normal       Mouth/Throat:      Mouth: Mucous membranes are moist       Pharynx: Oropharynx is clear  No oropharyngeal exudate or posterior oropharyngeal erythema  Eyes:      Pupils: Pupils are equal, round, and reactive to light  Cardiovascular:      Rate and Rhythm: Normal rate and regular rhythm  Heart sounds: Normal heart sounds  No murmur heard  Pulmonary:      Effort: Pulmonary effort is normal  No respiratory distress  Breath sounds: Normal breath sounds  No wheezing, rhonchi or rales  Musculoskeletal:         General: Normal range of motion        Cervical back: Normal range of motion and neck supple  Lymphadenopathy:      Cervical: No cervical adenopathy  Skin:     General: Skin is warm and dry  Neurological:      Mental Status: He is alert and oriented to person, place, and time     Psychiatric:         Mood and Affect: Mood and affect normal

## 2022-01-26 DIAGNOSIS — F41.9 ANXIETY: ICD-10-CM

## 2022-01-26 RX ORDER — CLONAZEPAM 0.5 MG/1
0.5 TABLET ORAL EVERY 12 HOURS PRN
Qty: 60 TABLET | Refills: 0 | Status: SHIPPED | OUTPATIENT
Start: 2022-01-26 | End: 2022-06-10 | Stop reason: SDUPTHER

## 2022-02-09 DIAGNOSIS — M25.519 SHOULDER PAIN, UNSPECIFIED CHRONICITY, UNSPECIFIED LATERALITY: ICD-10-CM

## 2022-02-09 RX ORDER — TRAMADOL HYDROCHLORIDE 50 MG/1
50 TABLET ORAL 3 TIMES DAILY PRN
Qty: 45 TABLET | Refills: 0 | Status: SHIPPED | OUTPATIENT
Start: 2022-02-09 | End: 2022-03-13 | Stop reason: SDUPTHER

## 2022-02-12 DIAGNOSIS — J20.9 BRONCHITIS WITH BRONCHOSPASM: ICD-10-CM

## 2022-02-14 RX ORDER — ALBUTEROL SULFATE 90 UG/1
AEROSOL, METERED RESPIRATORY (INHALATION)
Qty: 18 G | Refills: 1 | Status: SHIPPED | OUTPATIENT
Start: 2022-02-14

## 2022-03-13 DIAGNOSIS — M25.519 SHOULDER PAIN, UNSPECIFIED CHRONICITY, UNSPECIFIED LATERALITY: ICD-10-CM

## 2022-03-13 DIAGNOSIS — M10.9 PODAGRA: ICD-10-CM

## 2022-03-14 RX ORDER — TRAMADOL HYDROCHLORIDE 50 MG/1
50 TABLET ORAL 3 TIMES DAILY PRN
Qty: 45 TABLET | Refills: 0 | Status: SHIPPED | OUTPATIENT
Start: 2022-03-14 | End: 2022-04-13 | Stop reason: SDUPTHER

## 2022-03-14 RX ORDER — INDOMETHACIN 50 MG/1
50 CAPSULE ORAL 2 TIMES DAILY WITH MEALS
Qty: 28 CAPSULE | Refills: 0 | Status: SHIPPED | OUTPATIENT
Start: 2022-03-14 | End: 2022-04-21

## 2022-03-28 ENCOUNTER — TELEPHONE (OUTPATIENT)
Dept: FAMILY MEDICINE CLINIC | Facility: CLINIC | Age: 42
End: 2022-03-28

## 2022-03-28 DIAGNOSIS — L72.3 SEBACEOUS CYST: Primary | ICD-10-CM

## 2022-04-06 ENCOUNTER — OFFICE VISIT (OUTPATIENT)
Dept: SURGERY | Facility: CLINIC | Age: 42
End: 2022-04-06
Payer: COMMERCIAL

## 2022-04-06 ENCOUNTER — TELEPHONE (OUTPATIENT)
Dept: SURGERY | Facility: CLINIC | Age: 42
End: 2022-04-06

## 2022-04-06 VITALS
SYSTOLIC BLOOD PRESSURE: 128 MMHG | DIASTOLIC BLOOD PRESSURE: 80 MMHG | RESPIRATION RATE: 18 BRPM | HEIGHT: 70 IN | OXYGEN SATURATION: 98 % | WEIGHT: 269 LBS | BODY MASS INDEX: 38.51 KG/M2 | TEMPERATURE: 97.8 F | HEART RATE: 84 BPM

## 2022-04-06 DIAGNOSIS — K13.0 LIP LESION: ICD-10-CM

## 2022-04-06 DIAGNOSIS — L72.0 EPIDERMOID CYST OF SKIN OF SCALP: Primary | ICD-10-CM

## 2022-04-06 DIAGNOSIS — L72.3 SEBACEOUS CYST: ICD-10-CM

## 2022-04-06 PROCEDURE — 88304 TISSUE EXAM BY PATHOLOGIST: CPT | Performed by: PATHOLOGY

## 2022-04-06 PROCEDURE — 99203 OFFICE O/P NEW LOW 30 MIN: CPT | Performed by: SURGERY

## 2022-04-06 PROCEDURE — 11422 EXC H-F-NK-SP B9+MARG 1.1-2: CPT | Performed by: SURGERY

## 2022-04-06 PROCEDURE — 88305 TISSUE EXAM BY PATHOLOGIST: CPT | Performed by: PATHOLOGY

## 2022-04-06 PROCEDURE — 11441 EXC FACE-MM B9+MARG 0.6-1 CM: CPT | Performed by: SURGERY

## 2022-04-06 NOTE — ASSESSMENT & PLAN NOTE
Additionally the patient notes a bothersome lip lesion on the lower lip to the right adjacent to the crease of the lip  He has been told in the past that it represents a small accessory salivary gland  He finds himself bothered by the lesion and self inflicting trauma by chewing on it  He is interested in definitive diagnosis and treatment while here in the office today  On physical examination patient has a 3 mm lesion on the lower lateral right lip amenable to excision here in the office under local anesthesia  The technical details of the procedures well as the benefits and risks reviewed informed verbal consent obtained to proceed

## 2022-04-06 NOTE — PROGRESS NOTES
Assessment/Plan:    Epidermoid cyst of skin of scalp  The patient is a pleasant 27-year-old male presenting with a epidermoid cyst of the occipital scalp for which excisional biopsy under local anesthesia is now indicated  The options benefits risks and alternatives to the procedure reviewed and informed verbal consent obtained to proceed  Lip lesion  Additionally the patient notes a bothersome lip lesion on the lower lip to the right adjacent to the crease of the lip  He has been told in the past that it represents a small accessory salivary gland  He finds himself bothered by the lesion and self inflicting trauma by chewing on it  He is interested in definitive diagnosis and treatment while here in the office today  On physical examination patient has a 3 mm lesion on the lower lateral right lip amenable to excision here in the office under local anesthesia  The technical details of the procedures well as the benefits and risks reviewed informed verbal consent obtained to proceed  Subjective:      Patient ID: Gm Durham is a 40 y o  male  Patient came into to the office on the cyst on the back of his head and a cyst on his lip  Stated that he had the cyst for about 6-8 months with no discharge  No fever or chills  Desiree CASTREJON MA          Review of Systems   Constitutional: Negative for chills and fever  HENT: Negative for ear pain and sore throat  Eyes: Negative for pain and visual disturbance  Respiratory: Negative for cough and shortness of breath  Cardiovascular: Negative for chest pain and palpitations  Gastrointestinal: Negative for abdominal pain and vomiting  Genitourinary: Negative for dysuria and hematuria  Musculoskeletal: Negative for arthralgias and back pain  Skin: Negative for color change and rash  Neurological: Negative for seizures and syncope  All other systems reviewed and are negative          Objective:      /80 (BP Location: Left arm, Patient Position: Sitting, Cuff Size: Standard)   Pulse 84   Temp 97 8 °F (36 6 °C) (Temporal)   Resp 18   Ht 5' 10" (1 778 m)   Wt 122 kg (269 lb)   SpO2 98%   BMI 38 60 kg/m²            Physical Exam  Vitals and nursing note reviewed  Constitutional:       Appearance: He is well-developed  HENT:      Head: Normocephalic and atraumatic  Eyes:      Conjunctiva/sclera: Conjunctivae normal       Pupils: Pupils are equal, round, and reactive to light  Cardiovascular:      Rate and Rhythm: Normal rate and regular rhythm  Pulmonary:      Effort: Pulmonary effort is normal       Breath sounds: Normal breath sounds  Abdominal:      General: Bowel sounds are normal       Palpations: Abdomen is soft  Musculoskeletal:         General: Normal range of motion  Cervical back: Normal range of motion and neck supple  Skin:     General: Skin is warm and dry  Comments: 1  5 mm epidermoid cyst of the right parietal scalp  2  3 mm lateral lower right lip lesion of uncertain etiology    Neurological:      Mental Status: He is alert and oriented to person, place, and time  Psychiatric:         Behavior: Behavior normal          Thought Content: Thought content normal          Judgment: Judgment normal        Skin excision    Date/Time: 4/6/2022 12:41 PM  Performed by: Gely Aguiar MD  Authorized by: Gely Aguiar MD   Universal Protocol:  Consent: Verbal consent obtained    Consent given by: patient  Timeout called at: 4/6/2022 12:41 PM   Patient understanding: patient states understanding of the procedure being performed  Patient consent: the patient's understanding of the procedure matches consent given  Site marked: the operative site was marked  Required items: required blood products, implants, devices, and special equipment available  Patient identity confirmed: verbally with patient      Procedure Details - Skin Excision:     Number of Lesions:  2  Lesion 1:     Body area:  Head/neck Head/neck location:  Scalp       Final defect size (mm):  5    Malignancy: benign lesion      Repair type:  Linear closure    Graft type: full-thickness      Closure complexity: simple       Repair Comments: Procedure note: With informed verbal consent under sterile conditions using aseptic technique using 1% lidocaine local anesthesia with epinephrine and bicarbonate the 5 mm epidermoid cyst of the occipital scalp was sharply excised with a 15 blade and the wound closed primarily with simple interrupted sutures of 3-0 nylon  The wound dressed  The patient rolled supine  The skin prepped and draped on the lower lateral right lip  Skin infiltrated with 1% lidocaine local anesthesia with epinephrine  Skin incised to include the 3 mm lip lesion which was sent for permanent pathologic evaluation  The wound closed primarily with simple interrupted sutures of 5 0 chromic  Wound dressed  Patient tolerated both procedures well  Lesion 2:     Body area:  Head/neck    Head/neck location: Lower lateral right lip          Final defect size (mm):  3    Malignancy: malignancy unknown      Repair type:  Linear closure    Graft type: full-thickness      Closure complexity: simple

## 2022-04-06 NOTE — ASSESSMENT & PLAN NOTE
The patient is a pleasant 20-year-old male presenting with a epidermoid cyst of the occipital scalp for which excisional biopsy under local anesthesia is now indicated  The options benefits risks and alternatives to the procedure reviewed and informed verbal consent obtained to proceed

## 2022-04-06 NOTE — LETTER
April 6, 2022     Joanie Shook, [address]  [facility]    Patient: Linda Bhandari   YOB: 1980   Date of Visit: 4/6/2022       Dear Dr Darien Lloyd: Thank you for referring Macrinaelizabet Jose D to me for evaluation  Below are my notes for this consultation  If you have questions, please do not hesitate to call me  I look forward to following your patient along with you  Sincerely,        Adonay Friend MD        CC: No Recipients  Adonay Friend MD  4/6/2022 12:43 PM  Sign when Signing Visit  Assessment/Plan:    Epidermoid cyst of skin of scalp  The patient is a pleasant 45-year-old male presenting with a epidermoid cyst of the occipital scalp for which excisional biopsy under local anesthesia is now indicated  The options benefits risks and alternatives to the procedure reviewed and informed verbal consent obtained to proceed  Lip lesion  Additionally the patient notes a bothersome lip lesion on the lower lip to the right adjacent to the crease of the lip  He has been told in the past that it represents a small accessory salivary gland  He finds himself bothered by the lesion and self inflicting trauma by chewing on it  He is interested in definitive diagnosis and treatment while here in the office today  On physical examination patient has a 3 mm lesion on the lower lateral right lip amenable to excision here in the office under local anesthesia  The technical details of the procedures well as the benefits and risks reviewed informed verbal consent obtained to proceed  Subjective:      Patient ID: Linda Bhandari is a 40 y o  male  Patient came into to the office on the cyst on the back of his head and a cyst on his lip  Stated that he had the cyst for about 6-8 months with no discharge  No fever or chills  Desiree CASTREJON MA          Review of Systems   Constitutional: Negative for chills and fever     HENT: Negative for ear pain and sore throat  Eyes: Negative for pain and visual disturbance  Respiratory: Negative for cough and shortness of breath  Cardiovascular: Negative for chest pain and palpitations  Gastrointestinal: Negative for abdominal pain and vomiting  Genitourinary: Negative for dysuria and hematuria  Musculoskeletal: Negative for arthralgias and back pain  Skin: Negative for color change and rash  Neurological: Negative for seizures and syncope  All other systems reviewed and are negative  Objective:      /80 (BP Location: Left arm, Patient Position: Sitting, Cuff Size: Standard)   Pulse 84   Temp 97 8 °F (36 6 °C) (Temporal)   Resp 18   Ht 5' 10" (1 778 m)   Wt 122 kg (269 lb)   SpO2 98%   BMI 38 60 kg/m²            Physical Exam  Vitals and nursing note reviewed  Constitutional:       Appearance: He is well-developed  HENT:      Head: Normocephalic and atraumatic  Eyes:      Conjunctiva/sclera: Conjunctivae normal       Pupils: Pupils are equal, round, and reactive to light  Cardiovascular:      Rate and Rhythm: Normal rate and regular rhythm  Pulmonary:      Effort: Pulmonary effort is normal       Breath sounds: Normal breath sounds  Abdominal:      General: Bowel sounds are normal       Palpations: Abdomen is soft  Musculoskeletal:         General: Normal range of motion  Cervical back: Normal range of motion and neck supple  Skin:     General: Skin is warm and dry  Comments: 1  5 mm epidermoid cyst of the right parietal scalp  2  3 mm lateral lower right lip lesion of uncertain etiology    Neurological:      Mental Status: He is alert and oriented to person, place, and time  Psychiatric:         Behavior: Behavior normal          Thought Content:  Thought content normal          Judgment: Judgment normal        Skin excision    Date/Time: 4/6/2022 12:41 PM  Performed by: Alpha Gaucher, MD  Authorized by: Alpha Gaucher, MD   Universal Protocol:  Consent: Verbal consent obtained  Consent given by: patient  Timeout called at: 4/6/2022 12:41 PM   Patient understanding: patient states understanding of the procedure being performed  Patient consent: the patient's understanding of the procedure matches consent given  Site marked: the operative site was marked  Required items: required blood products, implants, devices, and special equipment available  Patient identity confirmed: verbally with patient      Procedure Details - Skin Excision:     Number of Lesions:  2  Lesion 1:     Body area:  Head/neck    Head/neck location:  Scalp       Final defect size (mm):  5    Malignancy: benign lesion      Repair type:  Linear closure    Graft type: full-thickness      Closure complexity: simple       Repair Comments: Procedure note: With informed verbal consent under sterile conditions using aseptic technique using 1% lidocaine local anesthesia with epinephrine and bicarbonate the 5 mm epidermoid cyst of the occipital scalp was sharply excised with a 15 blade and the wound closed primarily with simple interrupted sutures of 3-0 nylon  The wound dressed  The patient rolled supine  The skin prepped and draped on the lower lateral right lip  Skin infiltrated with 1% lidocaine local anesthesia with epinephrine  Skin incised to include the 3 mm lip lesion which was sent for permanent pathologic evaluation  The wound closed primarily with simple interrupted sutures of 5 0 chromic  Wound dressed  Patient tolerated both procedures well  Lesion 2:     Body area:  Head/neck    Head/neck location: Lower lateral right lip          Final defect size (mm):  3    Malignancy: malignancy unknown      Repair type:  Linear closure    Graft type: full-thickness      Closure complexity: simple

## 2022-04-06 NOTE — TELEPHONE ENCOUNTER
Patient has an appointment today @ 1:00 pm, I left him a message to see if he could come in for 11:00 due to Dr Velvet Tyler being needed at the hospital this afternoon  I also stated he could be seen by our PA or rescheduled

## 2022-04-07 ENCOUNTER — TELEPHONE (OUTPATIENT)
Dept: FAMILY MEDICINE CLINIC | Facility: CLINIC | Age: 42
End: 2022-04-07

## 2022-04-07 DIAGNOSIS — B34.9 VIRAL INFECTION, UNSPECIFIED: ICD-10-CM

## 2022-04-07 DIAGNOSIS — Z20.822 EXPOSURE TO COVID-19 VIRUS: ICD-10-CM

## 2022-04-07 LAB
SARS-COV-2 AG UPPER RESP QL IA: NEGATIVE
VALID CONTROL: NORMAL

## 2022-04-07 PROCEDURE — 87636 SARSCOV2 & INF A&B AMP PRB: CPT | Performed by: NURSE PRACTITIONER

## 2022-04-07 PROCEDURE — 87811 SARS-COV-2 COVID19 W/OPTIC: CPT | Performed by: NURSE PRACTITIONER

## 2022-04-07 NOTE — TELEPHONE ENCOUNTER
Pt is requesting a COVID/Flu test  His mother is very ill and he is now having PND, cough, congestion, sore throat, headache  Please call pt when it is okay for him to come down  Thanks!

## 2022-04-09 LAB
FLUAV RNA RESP QL NAA+PROBE: NEGATIVE
FLUBV RNA RESP QL NAA+PROBE: NEGATIVE
SARS-COV-2 RNA RESP QL NAA+PROBE: NEGATIVE

## 2022-04-12 ENCOUNTER — TELEPHONE (OUTPATIENT)
Dept: SURGERY | Facility: CLINIC | Age: 42
End: 2022-04-12

## 2022-04-13 ENCOUNTER — TELEPHONE (OUTPATIENT)
Dept: SURGERY | Facility: CLINIC | Age: 42
End: 2022-04-13

## 2022-04-13 ENCOUNTER — OFFICE VISIT (OUTPATIENT)
Dept: SURGERY | Facility: CLINIC | Age: 42
End: 2022-04-13

## 2022-04-13 VITALS — TEMPERATURE: 96.8 F

## 2022-04-13 DIAGNOSIS — K13.0 LIP LESION: Primary | ICD-10-CM

## 2022-04-13 DIAGNOSIS — M25.519 SHOULDER PAIN, UNSPECIFIED CHRONICITY, UNSPECIFIED LATERALITY: ICD-10-CM

## 2022-04-13 DIAGNOSIS — L72.0 EPIDERMOID CYST OF SKIN OF SCALP: ICD-10-CM

## 2022-04-13 PROCEDURE — 99024 POSTOP FOLLOW-UP VISIT: CPT | Performed by: SURGERY

## 2022-04-13 NOTE — ASSESSMENT & PLAN NOTE
Patient returns status post excision of a Pilar cyst from the right occipital scalp  Pathology reviewed the patient  Sutures removed  All questions answered to satisfaction of the patient  He has been encouraged to follow up with the practice at any point future with questions or concerns

## 2022-04-13 NOTE — LETTER
April 13, 2022     Ree Notice, [address]  [place] [address]    Patient: Rickey Shaffer   YOB: 1980   Date of Visit: 4/13/2022       Dear Dr Vinicius Gonzalez: Thank you for referring Jayjay Baez to me for evaluation  Below are my notes for this consultation  If you have questions, please do not hesitate to call me  I look forward to following your patient along with you  Sincerely,        Myron Mae MD        CC: No Recipients  Myron Mae MD  4/13/2022  2:21 PM  Incomplete  Assessment/Plan:    Cheilitis  Patient returns status post excision of the lower left lip lesion  Pathology reviewed with the patient  Surgical wound clean  One suture removed  All questions answered to satisfaction of the patient  He has been encouraged to follow up with the practice at any point future with questions or concerns  Epidermoid cyst of skin of scalp  Patient returns status post excision of a Pilar cyst from the right occipital scalp  Pathology reviewed the patient  Sutures removed  All questions answered to satisfaction of the patient  He has been encouraged to follow up with the practice at any point future with questions or concerns  Subjective:      Patient ID: Rickey Shaffer is a 40 y o  male  Patient came into the office for suture removal , s/p exc of scalp and lip excision 4/08/2022  States that he lip was bothersome and one of the sutures fell out, denied any problems with scalp excision  Denied fever or chills  Desiree CASTREJON MA        Review of Systems   Constitutional: Negative for chills and fever  HENT: Negative for ear pain and sore throat  Eyes: Negative for pain and visual disturbance  Respiratory: Negative for cough and shortness of breath  Cardiovascular: Negative for chest pain and palpitations  Gastrointestinal: Negative for abdominal pain and vomiting  Genitourinary: Negative for dysuria and hematuria  Musculoskeletal: Negative for arthralgias and back pain  Skin: Negative for color change and rash  Neurological: Negative for seizures and syncope  All other systems reviewed and are negative  Objective:      Temp (!) 96 8 °F (36 °C) (Temporal)          Physical Exam  Vitals and nursing note reviewed  Constitutional:       Appearance: He is well-developed  HENT:      Head: Normocephalic and atraumatic  Eyes:      Conjunctiva/sclera: Conjunctivae normal       Pupils: Pupils are equal, round, and reactive to light  Cardiovascular:      Rate and Rhythm: Normal rate and regular rhythm  Pulmonary:      Effort: Pulmonary effort is normal       Breath sounds: Normal breath sounds  Abdominal:      General: Bowel sounds are normal       Palpations: Abdomen is soft  Musculoskeletal:         General: Normal range of motion  Cervical back: Normal range of motion and neck supple  Skin:     General: Skin is warm and dry  Comments: Skin exam as described above   Neurological:      Mental Status: He is alert and oriented to person, place, and time  Psychiatric:         Behavior: Behavior normal          Thought Content:  Thought content normal          Judgment: Judgment normal

## 2022-04-13 NOTE — ASSESSMENT & PLAN NOTE
Patient returns status post excision of the lower left lip lesion  Pathology reviewed with the patient  Surgical wound clean  One suture removed  All questions answered to satisfaction of the patient  He has been encouraged to follow up with the practice at any point future with questions or concerns

## 2022-04-13 NOTE — PROGRESS NOTES
Assessment/Plan:    Cheilitis  Patient returns status post excision of the lower left lip lesion  Pathology reviewed with the patient  Surgical wound clean  One suture removed  All questions answered to satisfaction of the patient  He has been encouraged to follow up with the practice at any point future with questions or concerns  Epidermoid cyst of skin of scalp  Patient returns status post excision of a Pilar cyst from the right occipital scalp  Pathology reviewed the patient  Sutures removed  All questions answered to satisfaction of the patient  He has been encouraged to follow up with the practice at any point future with questions or concerns  Subjective:      Patient ID: Coreen Garcia is a 40 y o  male  Patient came into the office for suture removal , s/p exc of scalp and lip excision 4/08/2022  States that he lip was bothersome and one of the sutures fell out, denied any problems with scalp excision  Denied fever or chills  Desiree CASTREJON MA        Review of Systems   Constitutional: Negative for chills and fever  HENT: Negative for ear pain and sore throat  Eyes: Negative for pain and visual disturbance  Respiratory: Negative for cough and shortness of breath  Cardiovascular: Negative for chest pain and palpitations  Gastrointestinal: Negative for abdominal pain and vomiting  Genitourinary: Negative for dysuria and hematuria  Musculoskeletal: Negative for arthralgias and back pain  Skin: Negative for color change and rash  Neurological: Negative for seizures and syncope  All other systems reviewed and are negative  Objective:      Temp (!) 96 8 °F (36 °C) (Temporal)          Physical Exam  Vitals and nursing note reviewed  Constitutional:       Appearance: He is well-developed  HENT:      Head: Normocephalic and atraumatic  Eyes:      Conjunctiva/sclera: Conjunctivae normal       Pupils: Pupils are equal, round, and reactive to light  Cardiovascular:      Rate and Rhythm: Normal rate and regular rhythm  Pulmonary:      Effort: Pulmonary effort is normal       Breath sounds: Normal breath sounds  Abdominal:      General: Bowel sounds are normal       Palpations: Abdomen is soft  Musculoskeletal:         General: Normal range of motion  Cervical back: Normal range of motion and neck supple  Skin:     General: Skin is warm and dry  Comments: Skin exam as described above   Neurological:      Mental Status: He is alert and oriented to person, place, and time  Psychiatric:         Behavior: Behavior normal          Thought Content:  Thought content normal          Judgment: Judgment normal

## 2022-04-14 RX ORDER — TRAMADOL HYDROCHLORIDE 50 MG/1
50 TABLET ORAL 3 TIMES DAILY PRN
Qty: 45 TABLET | Refills: 0 | Status: SHIPPED | OUTPATIENT
Start: 2022-04-14 | End: 2022-05-10 | Stop reason: SDUPTHER

## 2022-04-14 NOTE — TELEPHONE ENCOUNTER
Due for office visit to aly his controlled agreement
Spoke with pt, he is going to call back to schedule
“Patient's name, , procedure and correct site were confirmed during the [facility] Timeout.”

## 2022-04-20 PROBLEM — K13.0 CHEILITIS: Status: RESOLVED | Noted: 2022-04-06 | Resolved: 2022-04-20

## 2022-04-20 PROBLEM — Z13.220 LIPID SCREENING: Status: RESOLVED | Noted: 2021-08-31 | Resolved: 2022-04-20

## 2022-04-20 PROBLEM — Z00.00 ANNUAL PHYSICAL EXAM: Status: RESOLVED | Noted: 2021-08-31 | Resolved: 2022-04-20

## 2022-04-20 PROBLEM — R53.82 CHRONIC FATIGUE: Status: RESOLVED | Noted: 2021-08-31 | Resolved: 2022-04-20

## 2022-04-20 PROBLEM — H60.541 ECZEMA OF RIGHT EXTERNAL EAR: Status: RESOLVED | Noted: 2021-08-31 | Resolved: 2022-04-20

## 2022-04-21 ENCOUNTER — APPOINTMENT (OUTPATIENT)
Dept: RADIOLOGY | Facility: CLINIC | Age: 42
End: 2022-04-21
Payer: COMMERCIAL

## 2022-04-21 ENCOUNTER — OFFICE VISIT (OUTPATIENT)
Dept: FAMILY MEDICINE CLINIC | Facility: CLINIC | Age: 42
End: 2022-04-21
Payer: COMMERCIAL

## 2022-04-21 VITALS
HEART RATE: 89 BPM | WEIGHT: 268.2 LBS | DIASTOLIC BLOOD PRESSURE: 78 MMHG | TEMPERATURE: 97.8 F | OXYGEN SATURATION: 99 % | HEIGHT: 70 IN | SYSTOLIC BLOOD PRESSURE: 130 MMHG | BODY MASS INDEX: 38.39 KG/M2

## 2022-04-21 DIAGNOSIS — M10.9 ACUTE GOUT INVOLVING TOE OF RIGHT FOOT, UNSPECIFIED CAUSE: ICD-10-CM

## 2022-04-21 DIAGNOSIS — M79.675 PAIN OF GREAT TOE, LEFT: ICD-10-CM

## 2022-04-21 DIAGNOSIS — R06.2 WHEEZING: ICD-10-CM

## 2022-04-21 DIAGNOSIS — E66.09 CLASS 2 OBESITY DUE TO EXCESS CALORIES WITHOUT SERIOUS COMORBIDITY WITH BODY MASS INDEX (BMI) OF 35.0 TO 35.9 IN ADULT: ICD-10-CM

## 2022-04-21 DIAGNOSIS — Z98.890 S/P SHOULDER SURGERY: ICD-10-CM

## 2022-04-21 DIAGNOSIS — M79.674 GREAT TOE PAIN, RIGHT: ICD-10-CM

## 2022-04-21 DIAGNOSIS — M25.561 ACUTE PAIN OF RIGHT KNEE: ICD-10-CM

## 2022-04-21 DIAGNOSIS — G89.29 CHRONIC RIGHT SHOULDER PAIN: ICD-10-CM

## 2022-04-21 DIAGNOSIS — R53.82 CHRONIC FATIGUE: ICD-10-CM

## 2022-04-21 DIAGNOSIS — F90.1 ATTENTION-DEFICIT HYPERACTIVITY DISORDER, PREDOMINANTLY HYPERACTIVE TYPE: ICD-10-CM

## 2022-04-21 DIAGNOSIS — Z79.899 CONTROLLED SUBSTANCE AGREEMENT SIGNED: ICD-10-CM

## 2022-04-21 DIAGNOSIS — L72.0 EPIDERMOID CYST OF SKIN OF SCALP: ICD-10-CM

## 2022-04-21 DIAGNOSIS — M10.072 ACUTE IDIOPATHIC GOUT OF LEFT FOOT: Primary | ICD-10-CM

## 2022-04-21 DIAGNOSIS — M25.511 CHRONIC RIGHT SHOULDER PAIN: ICD-10-CM

## 2022-04-21 DIAGNOSIS — F40.10 SOCIAL ANXIETY DISORDER: ICD-10-CM

## 2022-04-21 PROCEDURE — 4004F PT TOBACCO SCREEN RCVD TLK: CPT | Performed by: NURSE PRACTITIONER

## 2022-04-21 PROCEDURE — 3008F BODY MASS INDEX DOCD: CPT | Performed by: NURSE PRACTITIONER

## 2022-04-21 PROCEDURE — 73660 X-RAY EXAM OF TOE(S): CPT

## 2022-04-21 PROCEDURE — 99214 OFFICE O/P EST MOD 30 MIN: CPT | Performed by: NURSE PRACTITIONER

## 2022-04-21 PROCEDURE — 73562 X-RAY EXAM OF KNEE 3: CPT

## 2022-04-21 RX ORDER — METHYLPREDNISOLONE 4 MG/1
TABLET ORAL
Qty: 21 EACH | Refills: 0 | Status: SHIPPED | OUTPATIENT
Start: 2022-04-21 | End: 2022-05-11

## 2022-04-21 RX ORDER — ALLOPURINOL 100 MG/1
100 TABLET ORAL DAILY
Qty: 30 TABLET | Refills: 5 | Status: SHIPPED | OUTPATIENT
Start: 2022-04-21 | End: 2022-06-13 | Stop reason: SDUPTHER

## 2022-04-21 NOTE — PROGRESS NOTES
Assessment/Plan:           Problem List Items Addressed This Visit        Musculoskeletal and Integument    Acute idiopathic gout of left foot - Primary    Epidermoid cyst of skin of scalp    Acute gout involving toe of right foot    Relevant Medications    allopurinol (ZYLOPRIM) 100 mg tablet    Other Relevant Orders    Uric acid       Other    Attention-deficit hyperactivity disorder, predominantly hyperactive type    Social anxiety disorder     Taking the Clonazepam and is doing well          Class 2 obesity due to excess calories without serious comorbidity with body mass index (BMI) of 35 0 to 35 9 in adult    Chronic right shoulder pain    S/P shoulder surgery    Controlled substance agreement signed     CSA updated and signed patient PMED queried and compliant          Pain of great toe, left     DW patient will update his imaging in both feet and right knee and will start Allopurinol          Relevant Orders    XR toe left great min 2 views    Uric acid    Great toe pain, right     Patient has been having chronic symptoms of gout will check imaging and uric acid levels and start with allopurinol          Relevant Orders    XR toe right great min 2 view    Uric acid    Acute pain of right knee    Relevant Orders    XR knee 3 vw right non injury    Chronic fatigue    Relevant Orders    Lyme Antibody Profile with reflex to WB    Wheezing    Relevant Medications    methylPREDNISolone 4 MG tablet therapy pack            Subjective:      Patient ID: Jaspreet Arreola is a 40 y o  male  Patient here today to update his CSA patient is taking tramadol as needed for his chronic shoulder pain  Patient also taking the Clonazepam prn last rx was in 1/2022 PMED queried and compliant  Patient reports that he is having issues with his gout in his right foot great toe and alternates between the right and left foot and having flare ups of this and having flareup more frequently   He is also having pain in the right knee and both great toes and top of his feet  BMI Counseling: Body mass index is 38 48 kg/m²  The BMI is above normal  Nutrition recommendations include decreasing portion sizes, encouraging healthy choices of fruits and vegetables, decreasing fast food intake, consuming healthier snacks, limiting drinks that contain sugar, moderation in carbohydrate intake, increasing intake of lean protein, reducing intake of saturated and trans fat and reducing intake of cholesterol  Exercise recommendations include moderate physical activity 150 minutes/week  No pharmacotherapy was ordered  Patient referred to PCP  Rationale for BMI follow-up plan is due to patient being overweight or obese  The following portions of the patient's history were reviewed and updated as appropriate:   He  has a past medical history of Anxiety, Concussion, and Pyloric stenosis  He   Patient Active Problem List    Diagnosis Date Noted    Acute gout involving toe of right foot 04/21/2022    Pain of great toe, left 04/21/2022    Great toe pain, right 04/21/2022    Acute pain of right knee 04/21/2022    Chronic fatigue 04/21/2022    Wheezing 04/21/2022    Epidermoid cyst of skin of scalp 04/06/2022    Acute idiopathic gout of left foot 02/22/2021    Class 2 obesity due to excess calories without serious comorbidity with body mass index (BMI) of 35 0 to 35 9 in adult 12/12/2019    Chronic right shoulder pain 12/12/2019    S/P shoulder surgery 12/12/2019    Controlled substance agreement signed 12/12/2019    Attention-deficit hyperactivity disorder, predominantly hyperactive type 06/06/2017    Social anxiety disorder 06/06/2017     He  has a past surgical history that includes Spinal fusion; Shoulder arthroscopy (Right); and Hernia repair  His family history includes Lung cancer in his father; Other in his father; Sjogren's syndrome in his mother  He  reports that he has been smoking cigars   He has never used smokeless tobacco  He reports current alcohol use of about 7 0 standard drinks of alcohol per week  He reports that he does not use drugs  Current Outpatient Medications   Medication Sig Dispense Refill    albuterol (PROVENTIL HFA,VENTOLIN HFA) 90 mcg/act inhaler INHALE 2 PUFFS EVERY 6 HOURS AS NEEDED FOR WHEEZING 18 g 1    allopurinol (ZYLOPRIM) 100 mg tablet Take 1 tablet (100 mg total) by mouth daily 30 tablet 5    clobetasol (TEMOVATE) 0 05 % ointment Apply topically 2 (two) times a day 30 g 0    clonazePAM (KlonoPIN) 0 5 mg tablet Take 1 tablet (0 5 mg total) by mouth every 12 (twelve) hours as needed for anxiety 60 tablet 0    indomethacin (INDOCIN) 50 mg capsule Take 50 mg by mouth 2 (two) times a day with meals      indomethacin (INDOCIN) 50 mg capsule Take 1 capsule (50 mg total) by mouth 2 (two) times a day with meals for 14 days 28 capsule 0    traMADol (ULTRAM) 50 mg tablet Take 1 tablet (50 mg total) by mouth 3 (three) times a day as needed for moderate pain 45 tablet 0    methylPREDNISolone 4 MG tablet therapy pack Use as directed on package 21 each 0     No current facility-administered medications for this visit  He is allergic to other and pollen extract       Review of Systems   Constitutional: Negative for activity change, appetite change, chills, diaphoresis, fatigue, fever and unexpected weight change  HENT: Negative for congestion, ear pain, hearing loss, postnasal drip, sinus pressure, sinus pain, sneezing and sore throat  Eyes: Negative for pain, redness and visual disturbance  Respiratory: Negative for cough and shortness of breath  Cardiovascular: Negative for chest pain and leg swelling  Gastrointestinal: Negative for abdominal pain, diarrhea, nausea and vomiting  Musculoskeletal: Positive for arthralgias  Neurological: Negative for dizziness and light-headedness  Psychiatric/Behavioral: Negative for behavioral problems and dysphoric mood           Objective:      /78 (BP Location: Right arm, Patient Position: Sitting)   Pulse 89   Temp 97 8 °F (36 6 °C) (Temporal)   Ht 5' 10" (1 778 m)   Wt 122 kg (268 lb 3 2 oz)   SpO2 99%   BMI 38 48 kg/m²          Physical Exam  Vitals and nursing note reviewed  Constitutional:       General: He is not in acute distress  Appearance: He is well-developed  HENT:      Head: Normocephalic and atraumatic  Eyes:      Pupils: Pupils are equal, round, and reactive to light  Neck:      Thyroid: No thyromegaly  Cardiovascular:      Rate and Rhythm: Normal rate and regular rhythm  Heart sounds: Normal heart sounds  No murmur heard  Pulmonary:      Effort: Pulmonary effort is normal  No respiratory distress  Breath sounds: Wheezing present  Abdominal:      General: Bowel sounds are normal       Palpations: Abdomen is soft  Musculoskeletal:         General: Normal range of motion  Cervical back: Normal range of motion  Comments: Both great toes pain in both feet and right knee    Skin:     General: Skin is warm and dry  Neurological:      Mental Status: He is alert and oriented to person, place, and time

## 2022-04-21 NOTE — PATIENT INSTRUCTIONS
Gout   AMBULATORY CARE:   Gout  is a form of arthritis that causes severe joint pain and stiffness  Acute gout pain starts suddenly, gets worse quickly, and stops on its own  Acute gout can become chronic and cause permanent damage to your joints  Seek care immediately if:   · You have severe pain in one or more of your joints that you cannot tolerate  · You have a fever or redness that spreads beyond the joint area  Call your doctor if:   · You have new symptoms, such as a rash, after you start gout treatment  · Your joint pain and swelling do not go away, even after treatment  · You are not urinating as much or as often as you usually do  · You have trouble taking your gout medicines  · You have questions or concerns about your condition or care  Stages of gout:   · Hyperuricemia  starts with high levels of uric acid  Hyperuricemia is not gout, but it increases your risk for gout  You may have no symptoms at this stage, and it usually does not need treatment  · Acute gouty arthritis  starts with a sudden attack of pain and swelling, usually in 1 joint  The attack may last from a few days to 2 weeks  · Intercritical gout  is the time between attacks  You may go months or years without another attack  You will not have joint pain or stiffness, but this does not mean your gout is cured  You will still need treatment to prevent chronic gout  · Chronic tophaceous gout  develops if gout is not treated  Large amounts of uric acid crystals, called tophi, collect around your joints  The crystals can destroy or deform the joints  Gout attacks occur more often, and last hours to weeks  More than 1 joint may be painful and swollen  At this stage, gout symptoms do not go away on their own  Medicines: You may need any of the following:  · Prescription pain medicine  may be given  Ask your healthcare provider how to take this medicine safely   Some prescription pain medicines contain acetaminophen  Do not take other medicines that contain acetaminophen without talking to your healthcare provider  Too much acetaminophen may cause liver damage  Prescription pain medicine may cause constipation  Ask your healthcare provider how to prevent or treat constipation  · NSAIDs , such as ibuprofen, help decrease swelling, pain, and fever  This medicine is available with or without a doctor's order  NSAIDs can cause stomach bleeding or kidney problems in certain people  If you take blood thinner medicine, always ask your healthcare provider if NSAIDs are safe for you  Always read the medicine label and follow directions  · Gout medicine  decreases joint pain and swelling  It may also be given to prevent new gout attacks  · Steroids  reduce inflammation and can help your joint stiffness and pain during gout attacks  · Uric acid medicine  may be given to reduce the amount of uric acid your body makes  Some medicines may help you pass more uric acid when you urinate  · Take your medicine as directed  Contact your healthcare provider if you think your medicine is not helping or if you have side effects  Tell him or her if you are allergic to any medicine  Keep a list of the medicines, vitamins, and herbs you take  Include the amounts, and when and why you take them  Bring the list or the pill bottles to follow-up visits  Carry your medicine list with you in case of an emergency  Manage your symptoms:       · Rest your painful joint so it can heal   Your healthcare provider may recommend crutches or a walker if the affected joint is in a leg  · Apply ice to your joint  Ice decreases pain and swelling  Use an ice pack, or put crushed ice in a plastic bag  Cover the ice pack or bag with a towel before you apply it to your painful joint  Apply ice for 15 to 20 minutes every hour, or as directed  · Elevate your joint  Elevation helps reduce swelling and pain   Raise your joint above the level of your heart as often as you can  Prop your painful joint on pillows to keep it above your heart comfortably  · Go to physical therapy if directed  A physical therapist can teach you exercises to improve flexibility and range of motion  Help prevent gout attacks:   · Do not eat high-purine foods  These foods include meats, seafood, asparagus, spinach, cauliflower, and some types of beans  Healthcare providers may tell you to eat more low-fat milk products, such as yogurt  Milk products may decrease your risk for gout attacks  Vitamin C and coffee may also help  Your healthcare provider or dietitian can help you create a meal plan  · Drink liquids as directed  Liquids such as water help remove uric acid from your body  Ask how much liquid to drink each day and which liquids are best for you  · Maintain a healthy weight  Weight loss may decrease the amount of uric acid in your body  Ask your healthcare provider what a healthy weight is for you  Ask him or her to help you create a weight loss plan if you are overweight  · Control your blood sugar level if you have diabetes  Keep your blood sugar level in a normal range  This can help prevent gout attacks  · Limit or do not drink alcohol as directed  Alcohol can trigger a gout attack  Alcohol also increases your risk for dehydration  Ask your healthcare provider if alcohol is safe for you  Follow up with your doctor as directed: You may be referred to a rheumatologist or podiatrist  Write down your questions so you remember to ask them during your visits  © Copyright [employer] 2022 Information is for End User's use only and may not be sold, redistributed or otherwise used for commercial purposes  All illustrations and images included in CareNotes® are the copyrighted property of [employer]  or Dani Mckay  The above information is an  only   It is not intended as medical advice for individual conditions or treatments  Talk to your doctor, nurse or pharmacist before following any medical regimen to see if it is safe and effective for you

## 2022-04-21 NOTE — ASSESSMENT & PLAN NOTE
Patient has been having chronic symptoms of gout will check imaging and uric acid levels and start with allopurinol

## 2022-05-11 ENCOUNTER — OFFICE VISIT (OUTPATIENT)
Dept: FAMILY MEDICINE CLINIC | Facility: CLINIC | Age: 42
End: 2022-05-11
Payer: COMMERCIAL

## 2022-05-11 VITALS
SYSTOLIC BLOOD PRESSURE: 138 MMHG | BODY MASS INDEX: 37.22 KG/M2 | WEIGHT: 260 LBS | TEMPERATURE: 97.4 F | DIASTOLIC BLOOD PRESSURE: 70 MMHG | HEIGHT: 70 IN | OXYGEN SATURATION: 98 % | HEART RATE: 71 BPM

## 2022-05-11 DIAGNOSIS — M1A.0610 IDIOPATHIC CHRONIC GOUT OF RIGHT KNEE WITHOUT TOPHUS: Primary | ICD-10-CM

## 2022-05-11 PROCEDURE — 99213 OFFICE O/P EST LOW 20 MIN: CPT | Performed by: FAMILY MEDICINE

## 2022-05-11 PROCEDURE — 3008F BODY MASS INDEX DOCD: CPT | Performed by: FAMILY MEDICINE

## 2022-05-11 NOTE — LETTER
May 11, 2022     Patient: Angelica Dill  YOB: 1980  Date of Visit: 5/11/2022      To Whom it May Concern:    Mattmike Ruperto is under my professional care  Michaelaugusta Carson was seen in my office on 5/11/2022  Please excuse him for the following dates: 05/09, 05/10 and 05/11  Raffy Carson may return to work on 05/12/2022  If you have any questions or concerns, please don't hesitate to call           Sincerely,          Julian Tolbert MD        CC: No Recipients

## 2022-05-11 NOTE — PROGRESS NOTES
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  Symptoms resolved  Advise to go for Uric acid levels in 2-3 weeks  Consider increasing allopurinol if uric acid above 6  Follow up as needed        Subjective:      Patient ID: Tracy Sung is a 40 y o  male  Patient is here to follow up for gout flare up  He says that it started 3 days ago with right first toe pain and swelling then it moved to his right knee very painful  He was unable to bend his right knee due to pain  Also unable to walk during the flare up  Missed the last few days from work  Symptoms have completely resolved today  Takes allopurinol daily  The following portions of the patient's history were reviewed and updated as appropriate:   He  has a past medical history of Anxiety, Concussion, and Pyloric stenosis  He   Patient Active Problem List    Diagnosis Date Noted    Acute gout involving toe of right foot 04/21/2022    Pain of great toe, left 04/21/2022    Great toe pain, right 04/21/2022    Acute pain of right knee 04/21/2022    Chronic fatigue 04/21/2022    Wheezing 04/21/2022    Epidermoid cyst of skin of scalp 04/06/2022    Acute idiopathic gout of left foot 02/22/2021    Class 2 obesity due to excess calories without serious comorbidity with body mass index (BMI) of 35 0 to 35 9 in adult 12/12/2019    Chronic right shoulder pain 12/12/2019    S/P shoulder surgery 12/12/2019    Controlled substance agreement signed 12/12/2019    Attention-deficit hyperactivity disorder, predominantly hyperactive type 06/06/2017    Social anxiety disorder 06/06/2017     He  has a past surgical history that includes Spinal fusion; Shoulder arthroscopy (Right); and Hernia repair  His family history includes Lung cancer in his father;  Other in his father; Sjogren's syndrome in his mother  He  reports that he has been smoking cigars  He has never used smokeless tobacco  He reports current alcohol use of about 7 0 standard drinks of alcohol per week  He reports that he does not use drugs  Current Outpatient Medications   Medication Sig Dispense Refill    albuterol (PROVENTIL HFA,VENTOLIN HFA) 90 mcg/act inhaler INHALE 2 PUFFS EVERY 6 HOURS AS NEEDED FOR WHEEZING 18 g 1    allopurinol (ZYLOPRIM) 100 mg tablet Take 1 tablet (100 mg total) by mouth daily 30 tablet 5    clonazePAM (KlonoPIN) 0 5 mg tablet Take 1 tablet (0 5 mg total) by mouth every 12 (twelve) hours as needed for anxiety 60 tablet 0    indomethacin (INDOCIN) 50 mg capsule Take 50 mg by mouth 2 (two) times a day with meals      traMADol (ULTRAM) 50 mg tablet Take 1 tablet (50 mg total) by mouth 3 (three) times a day as needed for moderate pain 45 tablet 0    clobetasol (TEMOVATE) 0 05 % ointment Apply topically 2 (two) times a day (Patient not taking: Reported on 5/11/2022) 30 g 0    indomethacin (INDOCIN) 50 mg capsule Take 1 capsule (50 mg total) by mouth 2 (two) times a day with meals for 14 days 28 capsule 0     No current facility-administered medications for this visit       Review of Systems   Constitutional: Negative for activity change, appetite change, fatigue and fever  HENT: Negative for congestion and ear discharge  Respiratory: Negative for cough and shortness of breath  Cardiovascular: Negative for chest pain and palpitations  Gastrointestinal: Negative for diarrhea and nausea  Musculoskeletal: Positive for arthralgias, joint swelling and myalgias  Negative for back pain  Skin: Negative for color change and rash  Neurological: Negative for dizziness and headaches  Psychiatric/Behavioral: Negative for agitation and behavioral problems           Objective:      /70 (BP Location: Left arm, Patient Position: Sitting, Cuff Size: Large)   Pulse 71   Temp (!) 97 4 °F (36 3 °C)   Ht 5' 10" (1 778 m)   Wt 118 kg (260 lb)   SpO2 98%   BMI 37 31 kg/m²          Physical Exam  Constitutional:       General: He is not in acute distress  Appearance: He is well-developed  He is not diaphoretic  Eyes:      General: No scleral icterus  Pupils: Pupils are equal, round, and reactive to light  Cardiovascular:      Rate and Rhythm: Normal rate and regular rhythm  Heart sounds: Normal heart sounds  No murmur heard  Pulmonary:      Effort: Pulmonary effort is normal  No respiratory distress  Breath sounds: Normal breath sounds  No wheezing  Abdominal:      General: Bowel sounds are normal  There is no distension  Palpations: Abdomen is soft  Tenderness: There is no abdominal tenderness  Skin:     General: Skin is warm and dry  Findings: No rash  Neurological:      Mental Status: He is alert and oriented to person, place, and time

## 2022-06-03 ENCOUNTER — APPOINTMENT (OUTPATIENT)
Dept: LAB | Facility: CLINIC | Age: 42
End: 2022-06-03
Payer: COMMERCIAL

## 2022-06-03 DIAGNOSIS — M79.674 GREAT TOE PAIN, RIGHT: ICD-10-CM

## 2022-06-03 DIAGNOSIS — M10.9 ACUTE GOUT INVOLVING TOE OF RIGHT FOOT, UNSPECIFIED CAUSE: ICD-10-CM

## 2022-06-03 DIAGNOSIS — Z13.220 LIPID SCREENING: ICD-10-CM

## 2022-06-03 DIAGNOSIS — M10.072 ACUTE IDIOPATHIC GOUT OF LEFT FOOT: ICD-10-CM

## 2022-06-03 DIAGNOSIS — R53.82 CHRONIC FATIGUE: ICD-10-CM

## 2022-06-03 DIAGNOSIS — M79.675 PAIN OF GREAT TOE, LEFT: ICD-10-CM

## 2022-06-03 LAB
ALBUMIN SERPL BCP-MCNC: 4.3 G/DL (ref 3.5–5)
ALP SERPL-CCNC: 60 U/L (ref 46–116)
ALT SERPL W P-5'-P-CCNC: 45 U/L (ref 12–78)
ANION GAP SERPL CALCULATED.3IONS-SCNC: 1 MMOL/L (ref 4–13)
AST SERPL W P-5'-P-CCNC: 27 U/L (ref 5–45)
BASOPHILS # BLD AUTO: 0.06 THOUSANDS/ΜL (ref 0–0.1)
BASOPHILS NFR BLD AUTO: 1 % (ref 0–1)
BILIRUB SERPL-MCNC: 0.6 MG/DL (ref 0.2–1)
BUN SERPL-MCNC: 12 MG/DL (ref 5–25)
CALCIUM SERPL-MCNC: 9.7 MG/DL (ref 8.3–10.1)
CHLORIDE SERPL-SCNC: 106 MMOL/L (ref 100–108)
CHOLEST SERPL-MCNC: 155 MG/DL
CO2 SERPL-SCNC: 32 MMOL/L (ref 21–32)
CREAT SERPL-MCNC: 1.01 MG/DL (ref 0.6–1.3)
EOSINOPHIL # BLD AUTO: 0.04 THOUSAND/ΜL (ref 0–0.61)
EOSINOPHIL NFR BLD AUTO: 1 % (ref 0–6)
ERYTHROCYTE [DISTWIDTH] IN BLOOD BY AUTOMATED COUNT: 12.4 % (ref 11.6–15.1)
GFR SERPL CREATININE-BSD FRML MDRD: 91 ML/MIN/1.73SQ M
GLUCOSE P FAST SERPL-MCNC: 101 MG/DL (ref 65–99)
HCT VFR BLD AUTO: 47.6 % (ref 36.5–49.3)
HDLC SERPL-MCNC: 37 MG/DL
HGB BLD-MCNC: 16.6 G/DL (ref 12–17)
IMM GRANULOCYTES # BLD AUTO: 0.03 THOUSAND/UL (ref 0–0.2)
IMM GRANULOCYTES NFR BLD AUTO: 0 % (ref 0–2)
LDLC SERPL CALC-MCNC: 88 MG/DL (ref 0–100)
LYMPHOCYTES # BLD AUTO: 1.85 THOUSANDS/ΜL (ref 0.6–4.47)
LYMPHOCYTES NFR BLD AUTO: 25 % (ref 14–44)
MCH RBC QN AUTO: 29.4 PG (ref 26.8–34.3)
MCHC RBC AUTO-ENTMCNC: 34.9 G/DL (ref 31.4–37.4)
MCV RBC AUTO: 84 FL (ref 82–98)
MONOCYTES # BLD AUTO: 0.59 THOUSAND/ΜL (ref 0.17–1.22)
MONOCYTES NFR BLD AUTO: 8 % (ref 4–12)
NEUTROPHILS # BLD AUTO: 4.74 THOUSANDS/ΜL (ref 1.85–7.62)
NEUTS SEG NFR BLD AUTO: 65 % (ref 43–75)
NRBC BLD AUTO-RTO: 0 /100 WBCS
PLATELET # BLD AUTO: 255 THOUSANDS/UL (ref 149–390)
PMV BLD AUTO: 11.3 FL (ref 8.9–12.7)
POTASSIUM SERPL-SCNC: 4.1 MMOL/L (ref 3.5–5.3)
PROT SERPL-MCNC: 8 G/DL (ref 6.4–8.2)
RBC # BLD AUTO: 5.65 MILLION/UL (ref 3.88–5.62)
SODIUM SERPL-SCNC: 139 MMOL/L (ref 136–145)
TRIGL SERPL-MCNC: 148 MG/DL
URATE SERPL-MCNC: 6.3 MG/DL (ref 4.2–8)
WBC # BLD AUTO: 7.31 THOUSAND/UL (ref 4.31–10.16)

## 2022-06-03 PROCEDURE — 84550 ASSAY OF BLOOD/URIC ACID: CPT

## 2022-06-03 PROCEDURE — 80061 LIPID PANEL: CPT

## 2022-06-03 PROCEDURE — 36415 COLL VENOUS BLD VENIPUNCTURE: CPT

## 2022-06-03 PROCEDURE — 85025 COMPLETE CBC W/AUTO DIFF WBC: CPT

## 2022-06-03 PROCEDURE — 80053 COMPREHEN METABOLIC PANEL: CPT

## 2022-06-03 PROCEDURE — 86618 LYME DISEASE ANTIBODY: CPT

## 2022-06-04 LAB — B BURGDOR IGG+IGM SER-ACNC: 72

## 2022-06-10 DIAGNOSIS — F41.9 ANXIETY: ICD-10-CM

## 2022-06-10 DIAGNOSIS — M25.519 SHOULDER PAIN, UNSPECIFIED CHRONICITY, UNSPECIFIED LATERALITY: ICD-10-CM

## 2022-06-10 RX ORDER — CLONAZEPAM 0.5 MG/1
0.5 TABLET ORAL EVERY 12 HOURS PRN
Qty: 60 TABLET | Refills: 0 | Status: SHIPPED | OUTPATIENT
Start: 2022-06-10

## 2022-06-10 RX ORDER — TRAMADOL HYDROCHLORIDE 50 MG/1
50 TABLET ORAL 3 TIMES DAILY PRN
Qty: 45 TABLET | Refills: 0 | Status: SHIPPED | OUTPATIENT
Start: 2022-06-10 | End: 2022-07-11 | Stop reason: SDUPTHER

## 2022-06-13 DIAGNOSIS — M10.9 ACUTE GOUT INVOLVING TOE OF RIGHT FOOT, UNSPECIFIED CAUSE: ICD-10-CM

## 2022-06-13 RX ORDER — ALLOPURINOL 100 MG/1
100 TABLET ORAL DAILY
Qty: 30 TABLET | Refills: 0 | Status: SHIPPED | OUTPATIENT
Start: 2022-06-13 | End: 2022-07-05

## 2022-07-05 DIAGNOSIS — M10.9 ACUTE GOUT INVOLVING TOE OF RIGHT FOOT, UNSPECIFIED CAUSE: ICD-10-CM

## 2022-07-05 RX ORDER — ALLOPURINOL 100 MG/1
TABLET ORAL
Qty: 90 TABLET | Refills: 1 | Status: SHIPPED | OUTPATIENT
Start: 2022-07-05

## 2022-07-11 DIAGNOSIS — M25.519 SHOULDER PAIN, UNSPECIFIED CHRONICITY, UNSPECIFIED LATERALITY: ICD-10-CM

## 2022-07-11 RX ORDER — TRAMADOL HYDROCHLORIDE 50 MG/1
50 TABLET ORAL 3 TIMES DAILY PRN
Qty: 45 TABLET | Refills: 0 | Status: SHIPPED | OUTPATIENT
Start: 2022-07-11 | End: 2022-08-09 | Stop reason: SDUPTHER

## 2022-08-09 DIAGNOSIS — M25.519 SHOULDER PAIN, UNSPECIFIED CHRONICITY, UNSPECIFIED LATERALITY: ICD-10-CM

## 2022-08-09 RX ORDER — TRAMADOL HYDROCHLORIDE 50 MG/1
50 TABLET ORAL 3 TIMES DAILY PRN
Qty: 45 TABLET | Refills: 0 | Status: SHIPPED | OUTPATIENT
Start: 2022-08-09 | End: 2022-09-09 | Stop reason: SDUPTHER

## 2022-09-09 DIAGNOSIS — M25.519 SHOULDER PAIN, UNSPECIFIED CHRONICITY, UNSPECIFIED LATERALITY: ICD-10-CM

## 2022-09-09 RX ORDER — TRAMADOL HYDROCHLORIDE 50 MG/1
50 TABLET ORAL 3 TIMES DAILY PRN
Qty: 45 TABLET | Refills: 0 | Status: SHIPPED | OUTPATIENT
Start: 2022-09-09 | End: 2022-10-06 | Stop reason: SDUPTHER

## 2022-09-29 ENCOUNTER — RA CDI HCC (OUTPATIENT)
Dept: OTHER | Facility: HOSPITAL | Age: 42
End: 2022-09-29

## 2022-09-29 NOTE — PROGRESS NOTES
[facility] 75  coding opportunities       Chart reviewed, no opportunity found: CHART REVIEWED, NO OPPORTUNITY FOUND        Patients Insurance        Commercial Insurance: [employer]

## 2022-10-06 ENCOUNTER — OFFICE VISIT (OUTPATIENT)
Dept: FAMILY MEDICINE CLINIC | Facility: CLINIC | Age: 42
End: 2022-10-06
Payer: COMMERCIAL

## 2022-10-06 VITALS
DIASTOLIC BLOOD PRESSURE: 82 MMHG | HEART RATE: 93 BPM | TEMPERATURE: 97.8 F | BODY MASS INDEX: 36.99 KG/M2 | WEIGHT: 258.4 LBS | HEIGHT: 70 IN | SYSTOLIC BLOOD PRESSURE: 128 MMHG | OXYGEN SATURATION: 96 %

## 2022-10-06 DIAGNOSIS — M25.519 SHOULDER PAIN, UNSPECIFIED CHRONICITY, UNSPECIFIED LATERALITY: ICD-10-CM

## 2022-10-06 DIAGNOSIS — F11.20 CONTINUOUS OPIOID DEPENDENCE (HCC): Primary | ICD-10-CM

## 2022-10-06 DIAGNOSIS — M25.511 CHRONIC RIGHT SHOULDER PAIN: ICD-10-CM

## 2022-10-06 DIAGNOSIS — Z79.899 CONTROLLED SUBSTANCE AGREEMENT SIGNED: ICD-10-CM

## 2022-10-06 DIAGNOSIS — G89.29 CHRONIC RIGHT SHOULDER PAIN: ICD-10-CM

## 2022-10-06 DIAGNOSIS — F41.9 ANXIETY: ICD-10-CM

## 2022-10-06 PROCEDURE — 99214 OFFICE O/P EST MOD 30 MIN: CPT | Performed by: NURSE PRACTITIONER

## 2022-10-06 RX ORDER — NALOXONE HYDROCHLORIDE 4 MG/.1ML
1 SPRAY NASAL ONCE
Qty: 1 EACH | Refills: 1 | Status: SHIPPED | OUTPATIENT
Start: 2022-10-06 | End: 2022-10-06

## 2022-10-06 RX ORDER — TRAMADOL HYDROCHLORIDE 50 MG/1
50 TABLET ORAL 3 TIMES DAILY PRN
Qty: 45 TABLET | Refills: 0 | Status: SHIPPED | OUTPATIENT
Start: 2022-10-06

## 2022-10-06 RX ORDER — CLONAZEPAM 0.5 MG/1
0.5 TABLET ORAL EVERY 12 HOURS PRN
Qty: 60 TABLET | Refills: 0 | Status: SHIPPED | OUTPATIENT
Start: 2022-10-06

## 2022-10-06 NOTE — PROGRESS NOTES
Assessment/Plan     Problem List Items Addressed This Visit        Other    Chronic right shoulder pain     Patient renewal on his pain medications tramadol          Controlled substance agreement signed    Continuous opioid dependence ([facility] [address] ) - Primary    Relevant Medications    naloxone (NARCAN) 4 mg/0 1 mL nasal spray    Other Relevant Orders    Toxicology screen, urine (Clinic collect)      Other Visit Diagnoses     Anxiety        Relevant Medications    clonazePAM (KlonoPIN) 0 5 mg tablet    Shoulder pain, unspecified chronicity, unspecified laterality        Relevant Medications    traMADol (ULTRAM) 50 mg tablet         Treatment Plan: Patient will continue with the Tramadol as needed for his shoulder pain     Opiate risks  There are risks associated with opioid medications, including dependence, addiction and tolerance  The patient understands and agrees to use these medications only as prescribed  Potential side effects of the medications include, but are not limited to, constipation, drowsiness, addiction, impaired judgment and risk of fatal overdose if not taken as prescribed  The patient was warned against driving while taking sedation medications  Sharing medications is a felony  At this point in time, the patient is showing no signs of addiction, abuse, diversion or suicidal ideation  Drug screen  Drug screen collected during today's visit      PDMP review  [place] or [place]  reviewed  No red flags were identified; safe to proceed with prescription      I have spent 15 minutes directly with the patient  Greater than 50% of this time was spent in counseling/coordination of care regarding: risks and benefits of treatment options and instructions for management  Tobacco Cessation Counseling: Tobacco cessation counseling was provided   The patient is sincerely urged to quit consumption of tobacco  He is not ready to quit tobacco         Subjective     Opioid Management:   Type of visit: Follow-up    Pain related diagnoses: Right Shoulder Pain Chronic     Interval history: Patient has a history of chronic right shoulder pain and has had surgery for a torn labrium and surgery repair about 2011 since then he has had ongoing pain in the shoulder and limited mobility and had the ACDF surrgery and disc fusion in the cervical spine     Aberrant behavior?: No      Adverse effects from medication?: No      Screening Tools/Assessments:    Brief Pain Inventory (BPI):  1) Throughout our lives, most of us have had pain from time to time (such as minor headaches, sprains, and toothaches)  Have you had pain other than these everyday kinds of pain today? Yes  2) Where is your pain located? both shoulders  3) Rate your pain at its worst in the last 24 hours: 7  4) Rate your pain at its least in the last 24 hours: 2  5) Rate your average level of pain: 5  6) Rate your pain right now: 5  7) What treatments or medications are you receiving for your pain? tramadol, tylenol as needed  8) In the past 24 hours, how much relief have pain treatments or medication provided?  60%  9) During the past 24 hours, pain has interfered with your:     A) General activity: 5     B) Mood: 4     C) Walking ability: 1     D) Normal work (work outside the home & housework): 4     E) Relations with other people: 1     F) Sleep: 5     G) Enjoyment of life: 5    COMM:  Current COMM Score: 12 (positive, at risk patient)    Drug Screen:  Last drug screen was performed more than 365 days ago    Opioid agreement:  Active Opioid agreement on file?: Yes    Opioid agreement signed date: 4/21/2022  Opioid agreement expiration date: 4/21/2023    Naloxone:  Currently prescribed Naloxone (Narcan): Yes      Patient here today and reports that he is here to renew his tramadol and update his agreement     Pain Medications             clonazePAM (KlonoPIN) 0 5 mg tablet Take 1 tablet (0 5 mg total) by mouth every 12 (twelve) hours as needed for anxiety indomethacin (INDOCIN) 50 mg capsule Take 50 mg by mouth 2 (two) times a day with meals    traMADol (ULTRAM) 50 mg tablet Take 1 tablet (50 mg total) by mouth 3 (three) times a day as needed for moderate pain    indomethacin (INDOCIN) 50 mg capsule Take 1 capsule (50 mg total) by mouth 2 (two) times a day with meals for 14 days         Outpatient Morphine Milligram Equivalents Per Day     10/6/22 and after 15 MME/Day    Order Name Dose Route Frequency Maximum MME/Day     traMADol (ULTRAM) 50 mg tablet 50 mg Oral 3 times daily PRN 15 MME/Day    Total Potential Morphine Milligram Equivalents Per Day 15 MME/Day    Calculation Information        traMADol (ULTRAM) 50 mg tablet    traMADol 50 mg Tabs: single dose of 50 mg * 3 doses per day * morphine equivalence factor of 0 1 = 15 MME/Day                         PDMP Review       Value Time User    PDMP Reviewed  Yes 9/9/2022  8:27 AM MORRO Wilson         Review of Systems   Constitutional: Negative for activity change, appetite change, chills, diaphoresis, fatigue, fever and unexpected weight change  HENT: Negative for congestion, ear pain, hearing loss, postnasal drip, sinus pressure, sinus pain, sneezing and sore throat  Eyes: Negative for pain, redness and visual disturbance  Respiratory: Negative for cough and shortness of breath  Cardiovascular: Negative for chest pain and leg swelling  Gastrointestinal: Negative for abdominal pain, diarrhea, nausea and vomiting  Endocrine: Negative  Genitourinary: Negative  Musculoskeletal: Positive for arthralgias and myalgias  Skin: Negative  Allergic/Immunologic: Negative  Neurological: Negative for dizziness and light-headedness  Hematological: Negative  Psychiatric/Behavioral: Negative for behavioral problems and dysphoric mood       Objective     /82 (BP Location: Left arm, Patient Position: Sitting)   Pulse 93   Temp 97 8 °F (36 6 °C) (Temporal)   Ht 5' 10" (1 778 m)   Wt 117 kg (258 lb 6 4 oz)   SpO2 96%   BMI 37 08 kg/m²     Physical Exam  Vitals and nursing note reviewed  Constitutional:       Appearance: He is well-developed  HENT:      Head: Normocephalic and atraumatic  Right Ear: Tympanic membrane normal       Left Ear: Tympanic membrane normal       Nose: Nose normal       Mouth/Throat:      Mouth: Mucous membranes are moist    Eyes:      Conjunctiva/sclera: Conjunctivae normal    Cardiovascular:      Rate and Rhythm: Normal rate and regular rhythm  Heart sounds: No murmur heard  Pulmonary:      Effort: Pulmonary effort is normal  No respiratory distress  Breath sounds: Normal breath sounds  Abdominal:      Palpations: Abdomen is soft  Tenderness: There is no abdominal tenderness  Musculoskeletal:         General: Normal range of motion  Right shoulder: Tenderness present  Left shoulder: Tenderness present  Cervical back: Normal range of motion and neck supple  Skin:     General: Skin is warm and dry  Neurological:      General: No focal deficit present  Mental Status: He is alert and oriented to person, place, and time     Psychiatric:         Mood and Affect: Mood normal          MORRO Vieyra

## 2022-10-06 NOTE — PATIENT INSTRUCTIONS

## 2022-11-04 ENCOUNTER — TELEPHONE (OUTPATIENT)
Dept: FAMILY MEDICINE CLINIC | Facility: CLINIC | Age: 42
End: 2022-11-04

## 2022-11-04 DIAGNOSIS — M54.50 ACUTE BILATERAL LOW BACK PAIN WITHOUT SCIATICA: Primary | ICD-10-CM

## 2022-11-04 RX ORDER — CYCLOBENZAPRINE HCL 5 MG
5 TABLET ORAL 3 TIMES DAILY PRN
Qty: 60 TABLET | Refills: 0 | Status: SHIPPED | OUTPATIENT
Start: 2022-11-04 | End: 2022-11-25

## 2022-11-07 DIAGNOSIS — M25.519 SHOULDER PAIN, UNSPECIFIED CHRONICITY, UNSPECIFIED LATERALITY: ICD-10-CM

## 2022-11-07 RX ORDER — TRAMADOL HYDROCHLORIDE 50 MG/1
50 TABLET ORAL 3 TIMES DAILY PRN
Qty: 45 TABLET | Refills: 0 | Status: SHIPPED | OUTPATIENT
Start: 2022-11-07

## 2022-12-02 DIAGNOSIS — M25.519 SHOULDER PAIN, UNSPECIFIED CHRONICITY, UNSPECIFIED LATERALITY: ICD-10-CM

## 2022-12-02 LAB — HBA1C MFR BLD HPLC: 5.5 %

## 2022-12-02 RX ORDER — TRAMADOL HYDROCHLORIDE 50 MG/1
50 TABLET ORAL 3 TIMES DAILY PRN
Qty: 45 TABLET | Refills: 0 | Status: SHIPPED | OUTPATIENT
Start: 2022-12-02

## 2022-12-13 DIAGNOSIS — F41.9 ANXIETY: ICD-10-CM

## 2022-12-13 DIAGNOSIS — M10.9 PODAGRA: ICD-10-CM

## 2022-12-14 RX ORDER — INDOMETHACIN 50 MG/1
50 CAPSULE ORAL 2 TIMES DAILY WITH MEALS
Qty: 28 CAPSULE | Refills: 0 | Status: SHIPPED | OUTPATIENT
Start: 2022-12-14 | End: 2022-12-28

## 2022-12-14 RX ORDER — CLONAZEPAM 0.5 MG/1
0.5 TABLET ORAL EVERY 12 HOURS PRN
Qty: 60 TABLET | Refills: 0 | Status: SHIPPED | OUTPATIENT
Start: 2022-12-14

## 2023-01-04 DIAGNOSIS — M25.519 SHOULDER PAIN, UNSPECIFIED CHRONICITY, UNSPECIFIED LATERALITY: ICD-10-CM

## 2023-01-04 RX ORDER — TRAMADOL HYDROCHLORIDE 50 MG/1
50 TABLET ORAL 3 TIMES DAILY PRN
Qty: 45 TABLET | Refills: 0 | Status: SHIPPED | OUTPATIENT
Start: 2023-01-04

## 2023-01-11 DIAGNOSIS — M10.9 ACUTE GOUT INVOLVING TOE OF RIGHT FOOT, UNSPECIFIED CAUSE: ICD-10-CM

## 2023-01-11 RX ORDER — ALLOPURINOL 100 MG/1
TABLET ORAL
Qty: 90 TABLET | Refills: 1 | Status: SHIPPED | OUTPATIENT
Start: 2023-01-11

## 2023-01-12 ENCOUNTER — OFFICE VISIT (OUTPATIENT)
Dept: FAMILY MEDICINE CLINIC | Facility: CLINIC | Age: 43
End: 2023-01-12

## 2023-01-12 VITALS
BODY MASS INDEX: 38.02 KG/M2 | HEIGHT: 70 IN | OXYGEN SATURATION: 98 % | HEART RATE: 95 BPM | SYSTOLIC BLOOD PRESSURE: 138 MMHG | WEIGHT: 265.6 LBS | TEMPERATURE: 97.8 F | DIASTOLIC BLOOD PRESSURE: 84 MMHG

## 2023-01-12 DIAGNOSIS — F40.10 SOCIAL ANXIETY DISORDER: ICD-10-CM

## 2023-01-12 DIAGNOSIS — M10.072 ACUTE IDIOPATHIC GOUT OF LEFT FOOT: ICD-10-CM

## 2023-01-12 DIAGNOSIS — G89.29 CHRONIC RIGHT SHOULDER PAIN: ICD-10-CM

## 2023-01-12 DIAGNOSIS — F90.1 ATTENTION-DEFICIT HYPERACTIVITY DISORDER, PREDOMINANTLY HYPERACTIVE TYPE: ICD-10-CM

## 2023-01-12 DIAGNOSIS — M25.511 CHRONIC RIGHT SHOULDER PAIN: ICD-10-CM

## 2023-01-12 DIAGNOSIS — Z00.00 ANNUAL PHYSICAL EXAM: ICD-10-CM

## 2023-01-12 DIAGNOSIS — E66.09 CLASS 2 OBESITY DUE TO EXCESS CALORIES WITHOUT SERIOUS COMORBIDITY WITH BODY MASS INDEX (BMI) OF 35.0 TO 35.9 IN ADULT: ICD-10-CM

## 2023-01-12 DIAGNOSIS — M10.9 ACUTE GOUT INVOLVING TOE OF RIGHT FOOT, UNSPECIFIED CAUSE: ICD-10-CM

## 2023-01-12 DIAGNOSIS — F11.20 CONTINUOUS OPIOID DEPENDENCE (HCC): ICD-10-CM

## 2023-01-12 DIAGNOSIS — R53.82 CHRONIC FATIGUE: ICD-10-CM

## 2023-01-12 PROBLEM — M79.674 GREAT TOE PAIN, RIGHT: Status: RESOLVED | Noted: 2022-04-21 | Resolved: 2023-01-12

## 2023-01-12 PROBLEM — M25.561 ACUTE PAIN OF RIGHT KNEE: Status: RESOLVED | Noted: 2022-04-21 | Resolved: 2023-01-12

## 2023-01-12 PROBLEM — L72.0 EPIDERMOID CYST OF SKIN OF SCALP: Status: RESOLVED | Noted: 2022-04-06 | Resolved: 2023-01-12

## 2023-01-12 PROBLEM — M79.675 PAIN OF GREAT TOE, LEFT: Status: RESOLVED | Noted: 2022-04-21 | Resolved: 2023-01-12

## 2023-01-12 PROBLEM — R06.2 WHEEZING: Status: RESOLVED | Noted: 2022-04-21 | Resolved: 2023-01-12

## 2023-01-12 NOTE — PROGRESS NOTES
[id number] [address] [place] [address]    NAME: Casie Seaman  AGE: 40 y o  SEX: male  : 1980     DATE: 2023     Assessment and Plan:     Problem List Items Addressed This Visit        Musculoskeletal and Integument    Acute idiopathic gout of left foot     Patient is only having very occasional flare ups of his gout taking the allopurinol and is having some occasional and is ot ready to increase the dose          Acute gout involving toe of right foot       Other    Attention-deficit hyperactivity disorder, predominantly hyperactive type     Patient is functioning and is working well          Social anxiety disorder     Taking as needed         Class 2 obesity due to excess calories without serious comorbidity with body mass index (BMI) of 35 0 to 35 9 in adult     Weight loss discussed          Chronic right shoulder pain     Taking the Tramadol and using for his chronic right shoulder pain and now left shoulder is affected          Continuous opioid dependence ([facility] [address] )     Updated CSA and RUDS obtained          Annual physical exam - Primary    RESOLVED: Chronic fatigue       Immunizations and preventive care screenings were discussed with patient today  Appropriate education was printed on patient's after visit summary  Counseling:  Injury prevention: discussed safety/seat belts, safety helmets, smoke detectors, carbon dioxide detectors, and smoking near bedding or upholstery  Exercise: the importance of regular exercise/physical activity was discussed  Recommend exercise 3-5 times per week for at least 30 minutes  BMI Counseling: Body mass index is 38 11 kg/m²   The BMI is above normal  Nutrition recommendations include decreasing portion sizes, encouraging healthy choices of fruits and vegetables, decreasing fast food intake, consuming healthier snacks, limiting drinks that contain sugar, moderation in carbohydrate intake, increasing intake of lean protein, reducing intake of saturated and trans fat and reducing intake of cholesterol  Exercise recommendations include moderate physical activity 150 minutes/week  No pharmacotherapy was ordered  Patient referred to PCP  Rationale for BMI follow-up plan is due to patient being overweight or obese  Return in 3 months (on 4/12/2023) for Next scheduled follow up  Chief Complaint:     Chief Complaint   Patient presents with   • Follow-up     Medication review      History of Present Illness:     Adult Annual Physical   Patient here for a comprehensive physical exam  The patient reports no problems  Diet and Physical Activity  Diet/Nutrition: well balanced diet  Exercise: walking  Depression Screening  PHQ-2/9 Depression Screening    Little interest or pleasure in doing things: 0 - not at all  Feeling down, depressed, or hopeless: 0 - not at all       General Health  Sleep: sleeps well  Hearing: normal - bilateral   Vision: no vision problems  Dental: regular dental visits   Health  Symptoms include: none     Review of Systems:     Review of Systems   Constitutional: Negative for activity change, appetite change, chills, diaphoresis, fatigue, fever and unexpected weight change  HENT: Negative for congestion, ear pain, hearing loss, postnasal drip, sinus pressure, sinus pain, sneezing and sore throat  Eyes: Negative for pain, redness and visual disturbance  Respiratory: Negative for cough and shortness of breath  Cardiovascular: Negative for chest pain and leg swelling  Gastrointestinal: Negative for abdominal pain, diarrhea, nausea and vomiting  Endocrine: Negative  Genitourinary: Negative  Musculoskeletal: Positive for arthralgias and myalgias  Allergic/Immunologic: Negative  Neurological: Negative for dizziness and light-headedness  Psychiatric/Behavioral: Negative for behavioral problems and dysphoric mood        Past Medical History: Past Medical History:   Diagnosis Date   • Anxiety    • Concussion    • Pyloric stenosis       Past Surgical History:     Past Surgical History:   Procedure Laterality Date   • HERNIA REPAIR     • SHOULDER ARTHROSCOPY Right    • SPINAL FUSION        Family History:     Family History   Problem Relation Age of Onset   • Lung cancer Father    • Other Father         Back disor   • Sjogren's syndrome Mother       Social History:     Social History     Socioeconomic History   • Marital status: Single     Spouse name: None   • Number of children: None   • Years of education: None   • Highest education level: None   Occupational History   • None   Tobacco Use   • Smoking status: Light Smoker     Types: Cigars   • Smokeless tobacco: Never   Vaping Use   • Vaping Use: Never used   Substance and Sexual Activity   • Alcohol use:  Yes     Alcohol/week: 7 0 standard drinks     Types: 7 Cans of beer per week   • Drug use: Never   • Sexual activity: None   Other Topics Concern   • None   Social History Narrative    [facility]    Full-time employment    Lives with parents     Social Determinants of Health     Financial Resource Strain: Not on file   Food Insecurity: Not on file   Transportation Needs: Not on file   Physical Activity: Not on file   Stress: Not on file   Social Connections: Not on file   Intimate Partner Violence: Not on file   Housing Stability: Not on file      Current Medications:     Current Outpatient Medications   Medication Sig Dispense Refill   • allopurinol (ZYLOPRIM) 100 mg tablet TAKE 1 TABLET BY MOUTH EVERY DAY 90 tablet 1   • clonazePAM (KlonoPIN) 0 5 mg tablet Take 1 tablet (0 5 mg total) by mouth every 12 (twelve) hours as needed for anxiety 60 tablet 0   • cyclobenzaprine (FLEXERIL) 5 mg tablet Take 1 tablet (5 mg total) by mouth 3 (three) times a day as needed for muscle spasms for up to 21 days 60 tablet 0   • indomethacin (INDOCIN) 50 mg capsule Take 50 mg by mouth 2 (two) times a day with meals     • indomethacin (INDOCIN) 50 mg capsule Take 1 capsule (50 mg total) by mouth 2 (two) times a day with meals for 14 days 28 capsule 0   • naloxone (NARCAN) 4 mg/0 1 mL nasal spray 0 1 mL (4 mg total) into each nostril once for 1 dose If the desired response is not obtained after 2-3 minutes, administer an additional dose using a new spray 1 each 1   • traMADol (ULTRAM) 50 mg tablet Take 1 tablet (50 mg total) by mouth 3 (three) times a day as needed for moderate pain 45 tablet 0     No current facility-administered medications for this visit  Allergies: Allergies   Allergen Reactions   • Other Abdominal Pain     pollen   • Pollen Extract Cough      Physical Exam:     /84 (BP Location: Left arm, Patient Position: Sitting)   Pulse 95   Temp 97 8 °F (36 6 °C) (Temporal)   Ht 5' 10" (1 778 m)   Wt 120 kg (265 lb 9 6 oz)   SpO2 98%   BMI 38 11 kg/m²     Physical Exam  Vitals and nursing note reviewed  Constitutional:       General: He is not in acute distress  Appearance: He is well-developed  HENT:      Head: Normocephalic and atraumatic  Right Ear: Tympanic membrane normal       Left Ear: Tympanic membrane normal       Nose: Nose normal       Mouth/Throat:      Mouth: Mucous membranes are moist    Eyes:      Conjunctiva/sclera: Conjunctivae normal    Cardiovascular:      Rate and Rhythm: Normal rate and regular rhythm  Heart sounds: No murmur heard  Pulmonary:      Effort: Pulmonary effort is normal  No respiratory distress  Breath sounds: Normal breath sounds  Abdominal:      Palpations: Abdomen is soft  Tenderness: There is no abdominal tenderness  Musculoskeletal:         General: No swelling  Cervical back: Neck supple  Skin:     General: Skin is warm and dry  Capillary Refill: Capillary refill takes less than 2 seconds  Neurological:      General: No focal deficit present        Mental Status: He is alert and oriented to person, place, and time     Psychiatric:         Mood and Affect: Mood normal           Kenya Ortiz, [address]

## 2023-01-12 NOTE — ASSESSMENT & PLAN NOTE
Patient is only having very occasional flare ups of his gout taking the allopurinol and is having some occasional and is ot ready to increase the dose

## 2023-01-12 NOTE — PATIENT INSTRUCTIONS

## 2023-01-13 LAB
6MAM UR QL: NEGATIVE NG/ML
AMPHETAMINES UR QL: NEGATIVE NG/ML
BARBITURATES UR QL: NEGATIVE NG/ML
BENZODIAZ UR QL: NEGATIVE NG/ML
BZE UR QL: NEGATIVE NG/ML
CREAT UR-MCNC: 84.8 MG/DL
ETHANOL UR QL: NEGATIVE NG/ML
METHADONE UR QL: NEGATIVE NG/ML
OPIATES UR QL: NEGATIVE NG/ML
OXIDANTS UR QL: NEGATIVE MCG/ML
OXYCODONE UR QL: NEGATIVE NG/ML
PCP UR QL: NEGATIVE NG/ML
PH UR: 7.1 [PH] (ref 4.5–9)
THC UR QL: NEGATIVE NG/ML

## 2023-02-03 DIAGNOSIS — M25.519 SHOULDER PAIN, UNSPECIFIED CHRONICITY, UNSPECIFIED LATERALITY: ICD-10-CM

## 2023-02-03 RX ORDER — TRAMADOL HYDROCHLORIDE 50 MG/1
50 TABLET ORAL 3 TIMES DAILY PRN
Qty: 45 TABLET | Refills: 0 | Status: SHIPPED | OUTPATIENT
Start: 2023-02-03

## 2023-03-06 DIAGNOSIS — M25.519 SHOULDER PAIN, UNSPECIFIED CHRONICITY, UNSPECIFIED LATERALITY: ICD-10-CM

## 2023-03-06 RX ORDER — TRAMADOL HYDROCHLORIDE 50 MG/1
50 TABLET ORAL 3 TIMES DAILY PRN
Qty: 45 TABLET | Refills: 0 | Status: SHIPPED | OUTPATIENT
Start: 2023-03-06

## 2023-04-03 DIAGNOSIS — M25.519 SHOULDER PAIN, UNSPECIFIED CHRONICITY, UNSPECIFIED LATERALITY: ICD-10-CM

## 2023-04-04 RX ORDER — TRAMADOL HYDROCHLORIDE 50 MG/1
50 TABLET ORAL 3 TIMES DAILY PRN
Qty: 45 TABLET | Refills: 0 | Status: SHIPPED | OUTPATIENT
Start: 2023-04-04

## 2023-04-06 ENCOUNTER — RA CDI HCC (OUTPATIENT)
Dept: OTHER | Facility: HOSPITAL | Age: 43
End: 2023-04-06

## 2023-05-02 DIAGNOSIS — M25.519 SHOULDER PAIN, UNSPECIFIED CHRONICITY, UNSPECIFIED LATERALITY: ICD-10-CM

## 2023-05-02 RX ORDER — TRAMADOL HYDROCHLORIDE 50 MG/1
50 TABLET ORAL 3 TIMES DAILY PRN
Qty: 45 TABLET | Refills: 0 | Status: SHIPPED | OUTPATIENT
Start: 2023-05-02

## 2023-05-08 ENCOUNTER — OFFICE VISIT (OUTPATIENT)
Dept: FAMILY MEDICINE CLINIC | Facility: CLINIC | Age: 43
End: 2023-05-08

## 2023-05-08 VITALS
TEMPERATURE: 98.2 F | HEART RATE: 76 BPM | BODY MASS INDEX: 37.65 KG/M2 | OXYGEN SATURATION: 97 % | WEIGHT: 263 LBS | DIASTOLIC BLOOD PRESSURE: 84 MMHG | HEIGHT: 70 IN | SYSTOLIC BLOOD PRESSURE: 130 MMHG

## 2023-05-08 DIAGNOSIS — A08.4 VIRAL GASTROENTERITIS: Primary | ICD-10-CM

## 2023-05-08 NOTE — PROGRESS NOTES
Name: Saturnino James      : 1980      MRN: [id number]  Encounter Provider: Katy Serrano PA-C  Encounter Date: 2023   Encounter department: [address] [place] [address]     1  Viral gastroenteritis     hx sx and exam consistent with viral gastroenteritis  Continue supportive measures of bland diet, fluids, prn imodium  Sx improving  Return precautions, follow up prn    Subjective     Pt presents with concerns of 4 days of diarrhea, abdoinal cramping, nausea  No vomiting, fevers, black/bloody stools  Had body aches at onset  Notes many daily watery stools  Has been improving and today is better  Still loose but has some formed components  Tolerating PO  Urinating normally  Taking prn imodium  Review of Systems   Constitutional: Negative for chills, fatigue and fever  HENT: Negative for congestion, ear pain, hearing loss, nosebleeds, postnasal drip, rhinorrhea, sinus pressure, sinus pain, sneezing and sore throat  Eyes: Negative for pain, discharge, itching and visual disturbance  Respiratory: Negative for cough, chest tightness, shortness of breath and wheezing  Cardiovascular: Negative for chest pain, palpitations and leg swelling  Gastrointestinal: Positive for abdominal pain, diarrhea and nausea  Negative for blood in stool, constipation and vomiting  Genitourinary: Negative for frequency and urgency  Neurological: Negative for dizziness, light-headedness and numbness         Past Medical History:   Diagnosis Date   • Anxiety    • Concussion    • Pyloric stenosis      Past Surgical History:   Procedure Laterality Date   • HERNIA REPAIR     • SHOULDER ARTHROSCOPY Right    • SPINAL FUSION       Family History   Problem Relation Age of Onset   • Lung cancer Father    • Other Father         Back disor   • Sjogren's syndrome Mother      Social History     Socioeconomic History   • Marital status: Single     Spouse name: None   • Number of children: None   • Years of education: None   • Highest education level: None   Occupational History   • None   Tobacco Use   • Smoking status: Light Smoker     Types: Cigars   • Smokeless tobacco: Never   Vaping Use   • Vaping Use: Never used   Substance and Sexual Activity   • Alcohol use:  Yes     Alcohol/week: 7 0 standard drinks     Types: 7 Cans of beer per week   • Drug use: Never   • Sexual activity: None   Other Topics Concern   • None   Social History Narrative    [facility]    Full-time employment    Lives with parents     Social Determinants of Health     Financial Resource Strain: Not on file   Food Insecurity: Not on file   Transportation Needs: Not on file   Physical Activity: Not on file   Stress: Not on file   Social Connections: Not on file   Intimate Partner Violence: Not on file   Housing Stability: Not on file     Current Outpatient Medications on File Prior to Visit   Medication Sig   • traMADol (ULTRAM) 50 mg tablet Take 1 tablet (50 mg total) by mouth 3 (three) times a day as needed for moderate pain   • allopurinol (ZYLOPRIM) 100 mg tablet TAKE 1 TABLET BY MOUTH EVERY DAY   • clonazePAM (KlonoPIN) 0 5 mg tablet Take 1 tablet (0 5 mg total) by mouth every 12 (twelve) hours as needed for anxiety   • cyclobenzaprine (FLEXERIL) 5 mg tablet Take 1 tablet (5 mg total) by mouth 3 (three) times a day as needed for muscle spasms for up to 21 days   • indomethacin (INDOCIN) 50 mg capsule Take 50 mg by mouth 2 (two) times a day with meals   • indomethacin (INDOCIN) 50 mg capsule Take 1 capsule (50 mg total) by mouth 2 (two) times a day with meals for 14 days   • naloxone (NARCAN) 4 mg/0 1 mL nasal spray 0 1 mL (4 mg total) into each nostril once for 1 dose If the desired response is not obtained after 2-3 minutes, administer an additional dose using a new spray     Allergies   Allergen Reactions   • Other Abdominal Pain     pollen   • Pollen Extract Cough     Immunization History   Administered "Date(s) Administered   • COVID-19 MODERNA VACC 0 5 ML IM 04/30/2021, 05/28/2021   • DTP 1980, [birth year], 10/01/1982, 04/18/1986   • Influenza, injectable, quadrivalent, preservative free 0 5 mL 12/12/2019   • MMR 06/17/1982   • OPV 1980, 1980, 02/09/1982, 10/01/1984, 04/18/1986   • TD (adult) Preservative Free 01/30/1993   • Tdap 12/12/2019       Objective     /84 (BP Location: Left arm, Patient Position: Sitting, Cuff Size: Large)   Pulse 76   Temp 98 2 °F (36 8 °C)   Ht 5' 10\" (1 778 m)   Wt 119 kg (263 lb)   SpO2 97%   BMI 37 74 kg/m²     Physical Exam  Vitals and nursing note reviewed  Constitutional:       General: He is not in acute distress  Appearance: Normal appearance  HENT:      Head: Normocephalic and atraumatic  Nose: Nose normal       Mouth/Throat:      Mouth: Mucous membranes are moist       Pharynx: Oropharynx is clear  No oropharyngeal exudate or posterior oropharyngeal erythema  Eyes:      Pupils: Pupils are equal, round, and reactive to light  Cardiovascular:      Rate and Rhythm: Normal rate and regular rhythm  Heart sounds: Normal heart sounds  No murmur heard  Pulmonary:      Effort: Pulmonary effort is normal  No respiratory distress  Breath sounds: Normal breath sounds  No wheezing, rhonchi or rales  Abdominal:      General: Bowel sounds are normal  There is no distension  Palpations: Abdomen is soft  Tenderness: There is no abdominal tenderness  There is no guarding  Musculoskeletal:         General: Normal range of motion  Cervical back: Normal range of motion and neck supple  Skin:     General: Skin is warm and dry  Neurological:      Mental Status: He is alert and oriented to person, place, and time     Psychiatric:         Mood and Affect: Mood and affect normal        Lois Walters PA-C  "

## 2023-05-08 NOTE — LETTER
May 8, 2023     Patient: Hesham Bella  YOB: 1980  Date of Visit: 5/8/2023      To Whom it May Concern:    Cecilia Suero is under my professional care  Anel Grimes was seen in my office on 5/8/2023  Anel Grimes may return to work on 5/9/2023  Please excuse him on 5/5 and 5/8/23  If you have any questions or concerns, please don't hesitate to call           Sincerely,          Anel Grimes PA-C        CC: No Recipients

## 2023-05-25 DIAGNOSIS — M25.519 SHOULDER PAIN, UNSPECIFIED CHRONICITY, UNSPECIFIED LATERALITY: ICD-10-CM

## 2023-05-25 DIAGNOSIS — L20.82 FLEXURAL ECZEMA: Primary | ICD-10-CM

## 2023-05-25 RX ORDER — CLOBETASOL PROPIONATE 0.5 MG/G
OINTMENT TOPICAL 2 TIMES DAILY
Qty: 60 G | Refills: 1 | Status: SHIPPED | OUTPATIENT
Start: 2023-05-25

## 2023-05-26 RX ORDER — TRAMADOL HYDROCHLORIDE 50 MG/1
50 TABLET ORAL 3 TIMES DAILY PRN
Qty: 45 TABLET | Refills: 0 | Status: SHIPPED | OUTPATIENT
Start: 2023-05-26

## 2023-06-16 ENCOUNTER — APPOINTMENT (OUTPATIENT)
Dept: LAB | Facility: CLINIC | Age: 43
End: 2023-06-16
Payer: COMMERCIAL

## 2023-06-16 DIAGNOSIS — M10.9 ACUTE GOUT INVOLVING TOE OF RIGHT FOOT, UNSPECIFIED CAUSE: ICD-10-CM

## 2023-06-16 DIAGNOSIS — F41.9 ANXIETY: ICD-10-CM

## 2023-06-16 LAB
ALBUMIN SERPL BCP-MCNC: 4.3 G/DL (ref 3.5–5)
ALP SERPL-CCNC: 66 U/L (ref 46–116)
ALT SERPL W P-5'-P-CCNC: 46 U/L (ref 12–78)
ANION GAP SERPL CALCULATED.3IONS-SCNC: 2 MMOL/L (ref 4–13)
AST SERPL W P-5'-P-CCNC: 28 U/L (ref 5–45)
BASOPHILS # BLD AUTO: 0.06 THOUSANDS/ÂΜL (ref 0–0.1)
BASOPHILS NFR BLD AUTO: 1 % (ref 0–1)
BILIRUB SERPL-MCNC: 0.78 MG/DL (ref 0.2–1)
BUN SERPL-MCNC: 16 MG/DL (ref 5–25)
CALCIUM SERPL-MCNC: 9.6 MG/DL (ref 8.3–10.1)
CHLORIDE SERPL-SCNC: 107 MMOL/L (ref 96–108)
CO2 SERPL-SCNC: 29 MMOL/L (ref 21–32)
CREAT SERPL-MCNC: 0.93 MG/DL (ref 0.6–1.3)
EOSINOPHIL # BLD AUTO: 0.03 THOUSAND/ÂΜL (ref 0–0.61)
EOSINOPHIL NFR BLD AUTO: 1 % (ref 0–6)
ERYTHROCYTE [DISTWIDTH] IN BLOOD BY AUTOMATED COUNT: 12.3 % (ref 11.6–15.1)
GFR SERPL CREATININE-BSD FRML MDRD: 100 ML/MIN/1.73SQ M
GLUCOSE P FAST SERPL-MCNC: 97 MG/DL (ref 65–99)
HCT VFR BLD AUTO: 50.7 % (ref 36.5–49.3)
HGB BLD-MCNC: 16.8 G/DL (ref 12–17)
IMM GRANULOCYTES # BLD AUTO: 0.03 THOUSAND/UL (ref 0–0.2)
IMM GRANULOCYTES NFR BLD AUTO: 1 % (ref 0–2)
LYMPHOCYTES # BLD AUTO: 1.92 THOUSANDS/ÂΜL (ref 0.6–4.47)
LYMPHOCYTES NFR BLD AUTO: 29 % (ref 14–44)
MCH RBC QN AUTO: 28.8 PG (ref 26.8–34.3)
MCHC RBC AUTO-ENTMCNC: 33.1 G/DL (ref 31.4–37.4)
MCV RBC AUTO: 87 FL (ref 82–98)
MONOCYTES # BLD AUTO: 0.55 THOUSAND/ÂΜL (ref 0.17–1.22)
MONOCYTES NFR BLD AUTO: 8 % (ref 4–12)
NEUTROPHILS # BLD AUTO: 3.95 THOUSANDS/ÂΜL (ref 1.85–7.62)
NEUTS SEG NFR BLD AUTO: 60 % (ref 43–75)
NRBC BLD AUTO-RTO: 0 /100 WBCS
PLATELET # BLD AUTO: 261 THOUSANDS/UL (ref 149–390)
PMV BLD AUTO: 11.7 FL (ref 8.9–12.7)
POTASSIUM SERPL-SCNC: 4.1 MMOL/L (ref 3.5–5.3)
PROT SERPL-MCNC: 8.1 G/DL (ref 6.4–8.4)
RBC # BLD AUTO: 5.83 MILLION/UL (ref 3.88–5.62)
SODIUM SERPL-SCNC: 138 MMOL/L (ref 135–147)
TSH SERPL DL<=0.05 MIU/L-ACNC: 1.79 UIU/ML (ref 0.45–4.5)
URATE SERPL-MCNC: 6.4 MG/DL (ref 3.5–8.5)
WBC # BLD AUTO: 6.54 THOUSAND/UL (ref 4.31–10.16)

## 2023-06-16 PROCEDURE — 84550 ASSAY OF BLOOD/URIC ACID: CPT

## 2023-06-16 PROCEDURE — 36415 COLL VENOUS BLD VENIPUNCTURE: CPT

## 2023-06-16 PROCEDURE — 85025 COMPLETE CBC W/AUTO DIFF WBC: CPT

## 2023-06-16 PROCEDURE — 84443 ASSAY THYROID STIM HORMONE: CPT

## 2023-06-16 PROCEDURE — 80053 COMPREHEN METABOLIC PANEL: CPT

## 2023-06-20 DIAGNOSIS — M25.519 SHOULDER PAIN, UNSPECIFIED CHRONICITY, UNSPECIFIED LATERALITY: ICD-10-CM

## 2023-06-21 RX ORDER — TRAMADOL HYDROCHLORIDE 50 MG/1
50 TABLET ORAL 3 TIMES DAILY PRN
Qty: 45 TABLET | Refills: 0 | Status: SHIPPED | OUTPATIENT
Start: 2023-06-21

## 2023-07-19 DIAGNOSIS — M25.519 SHOULDER PAIN, UNSPECIFIED CHRONICITY, UNSPECIFIED LATERALITY: ICD-10-CM

## 2023-07-19 DIAGNOSIS — M10.9 ACUTE GOUT INVOLVING TOE OF RIGHT FOOT, UNSPECIFIED CAUSE: ICD-10-CM

## 2023-07-20 RX ORDER — ALLOPURINOL 100 MG/1
100 TABLET ORAL DAILY
Qty: 90 TABLET | Refills: 0 | Status: SHIPPED | OUTPATIENT
Start: 2023-07-20 | End: 2023-07-24 | Stop reason: SDUPTHER

## 2023-07-20 RX ORDER — TRAMADOL HYDROCHLORIDE 50 MG/1
50 TABLET ORAL 3 TIMES DAILY PRN
Qty: 45 TABLET | Refills: 0 | Status: SHIPPED | OUTPATIENT
Start: 2023-07-20

## 2023-07-24 ENCOUNTER — OFFICE VISIT (OUTPATIENT)
Dept: FAMILY MEDICINE CLINIC | Facility: CLINIC | Age: 43
End: 2023-07-24
Payer: COMMERCIAL

## 2023-07-24 VITALS
WEIGHT: 260 LBS | HEART RATE: 93 BPM | DIASTOLIC BLOOD PRESSURE: 94 MMHG | BODY MASS INDEX: 37.22 KG/M2 | TEMPERATURE: 98.8 F | SYSTOLIC BLOOD PRESSURE: 148 MMHG | OXYGEN SATURATION: 97 % | HEIGHT: 70 IN

## 2023-07-24 DIAGNOSIS — M25.522 ELBOW PAIN, LEFT: Primary | ICD-10-CM

## 2023-07-24 DIAGNOSIS — M10.9 ACUTE GOUT INVOLVING TOE OF RIGHT FOOT, UNSPECIFIED CAUSE: ICD-10-CM

## 2023-07-24 PROCEDURE — 99213 OFFICE O/P EST LOW 20 MIN: CPT | Performed by: NURSE PRACTITIONER

## 2023-07-24 RX ORDER — ALLOPURINOL 100 MG/1
200 TABLET ORAL DAILY
Qty: 60 TABLET | Refills: 5 | Status: SHIPPED | OUTPATIENT
Start: 2023-07-24 | End: 2024-01-20

## 2023-07-24 NOTE — ASSESSMENT & PLAN NOTE
Patient had a joint pain and flare up and thinking was the gout will order the allopurinol 200 mg daily and discussed using elbow strap

## 2023-07-24 NOTE — PROGRESS NOTES
Name: Betty Villanueva      : 1980      MRN: [id number]  Encounter Provider: MORRO Andrade  Encounter Date: 2023   Encounter department: [address] [place] [address]     1. Elbow pain, left  Assessment & Plan:  Patient had a joint pain and flare up and thinking was the gout will order the allopurinol 200 mg daily and discussed using elbow strap       2. Acute gout involving toe of right foot, unspecified cause  -     allopurinol (ZYLOPRIM) 100 mg tablet; Take 2 tablets (200 mg total) by mouth daily        Depression Screening and Follow-up Plan: Patient was screened for depression during today's encounter. They screened negative with a PHQ-2 score of 1. Subjective      Patient here today and reports that he was on a camping trip and reports that he had left elbow pain and swelling in his left elbow and reports that it started 5 days ago with pain in the left elbow and he had heat and swelling in the elbow at this point is manageable and is on allopurinol and is working well for him     Review of Systems   Constitutional: Negative for activity change, appetite change, chills, diaphoresis, fatigue, fever and unexpected weight change. HENT: Negative for congestion, ear pain, hearing loss, postnasal drip, sinus pressure, sinus pain, sneezing and sore throat. Eyes: Negative for pain, redness and visual disturbance. Respiratory: Negative for cough and shortness of breath. Cardiovascular: Negative for chest pain and leg swelling. Gastrointestinal: Negative for abdominal pain, diarrhea, nausea and vomiting. Endocrine: Negative. Genitourinary: Negative. Musculoskeletal: Positive for arthralgias. Allergic/Immunologic: Negative. Neurological: Negative for dizziness and light-headedness. Hematological: Negative. Psychiatric/Behavioral: Negative for behavioral problems and dysphoric mood.        Current Outpatient Medications on File Prior to Visit Medication Sig   • clobetasol (TEMOVATE) 0.05 % ointment Apply topically 2 (two) times a day   • clonazePAM (KlonoPIN) 0.5 mg tablet Take 1 tablet (0.5 mg total) by mouth every 12 (twelve) hours as needed for anxiety   • indomethacin (INDOCIN) 50 mg capsule Take 50 mg by mouth 2 (two) times a day with meals   • traMADol (ULTRAM) 50 mg tablet Take 1 tablet (50 mg total) by mouth 3 (three) times a day as needed for moderate pain   • [DISCONTINUED] allopurinol (ZYLOPRIM) 100 mg tablet Take 1 tablet (100 mg total) by mouth daily   • cyclobenzaprine (FLEXERIL) 5 mg tablet Take 1 tablet (5 mg total) by mouth 3 (three) times a day as needed for muscle spasms for up to 21 days   • indomethacin (INDOCIN) 50 mg capsule Take 1 capsule (50 mg total) by mouth 2 (two) times a day with meals for 14 days   • naloxone (NARCAN) 4 mg/0.1 mL nasal spray 0.1 mL (4 mg total) into each nostril once for 1 dose If the desired response is not obtained after 2-3 minutes, administer an additional dose using a new spray       Objective     /94 (BP Location: Left arm, Patient Position: Sitting, Cuff Size: Large)   Pulse 93   Temp 98.8 °F (37.1 °C)   Ht 5' 10" (1.778 m)   Wt 118 kg (260 lb)   SpO2 97%   BMI 37.31 kg/m²     Physical Exam  Vitals and nursing note reviewed. Constitutional:       General: He is not in acute distress. Appearance: He is well-developed. HENT:      Head: Normocephalic and atraumatic. Right Ear: Tympanic membrane normal.      Left Ear: Tympanic membrane normal.      Mouth/Throat:      Mouth: Mucous membranes are moist.   Eyes:      Pupils: Pupils are equal, round, and reactive to light. Neck:      Thyroid: No thyromegaly. Cardiovascular:      Rate and Rhythm: Normal rate and regular rhythm. Heart sounds: Normal heart sounds. No murmur heard. Pulmonary:      Effort: Pulmonary effort is normal. No respiratory distress. Breath sounds: Normal breath sounds. No wheezing.    Abdominal: General: Bowel sounds are normal.      Palpations: Abdomen is soft. Musculoskeletal:         General: Normal range of motion. Left elbow: Tenderness present in medial epicondyle. Cervical back: Normal range of motion. Skin:     General: Skin is warm and dry. Neurological:      General: No focal deficit present. Mental Status: He is alert and oriented to person, place, and time.    Psychiatric:         Mood and Affect: Mood normal.       MORRO Degroot

## 2023-08-11 DIAGNOSIS — F41.9 ANXIETY: ICD-10-CM

## 2023-08-14 DIAGNOSIS — M25.519 SHOULDER PAIN, UNSPECIFIED CHRONICITY, UNSPECIFIED LATERALITY: ICD-10-CM

## 2023-08-14 RX ORDER — CLONAZEPAM 0.5 MG/1
0.5 TABLET ORAL EVERY 12 HOURS PRN
Qty: 60 TABLET | Refills: 0 | Status: SHIPPED | OUTPATIENT
Start: 2023-08-14

## 2023-08-14 RX ORDER — TRAMADOL HYDROCHLORIDE 50 MG/1
50 TABLET ORAL 3 TIMES DAILY PRN
Qty: 45 TABLET | Refills: 0 | Status: SHIPPED | OUTPATIENT
Start: 2023-08-14

## 2023-09-11 DIAGNOSIS — M10.9 ACUTE GOUT INVOLVING TOE OF RIGHT FOOT, UNSPECIFIED CAUSE: ICD-10-CM

## 2023-09-11 DIAGNOSIS — M25.519 SHOULDER PAIN, UNSPECIFIED CHRONICITY, UNSPECIFIED LATERALITY: ICD-10-CM

## 2023-09-11 RX ORDER — ALLOPURINOL 100 MG/1
200 TABLET ORAL DAILY
Qty: 60 TABLET | Refills: 0 | Status: SHIPPED | OUTPATIENT
Start: 2023-09-11 | End: 2024-03-09

## 2023-09-12 RX ORDER — TRAMADOL HYDROCHLORIDE 50 MG/1
50 TABLET ORAL 3 TIMES DAILY PRN
Qty: 45 TABLET | Refills: 0 | Status: SHIPPED | OUTPATIENT
Start: 2023-09-12

## 2023-10-06 DIAGNOSIS — M10.9 ACUTE GOUT INVOLVING TOE OF RIGHT FOOT, UNSPECIFIED CAUSE: ICD-10-CM

## 2023-10-06 RX ORDER — ALLOPURINOL 100 MG/1
200 TABLET ORAL DAILY
Qty: 180 TABLET | Refills: 1 | Status: SHIPPED | OUTPATIENT
Start: 2023-10-06

## 2023-10-09 DIAGNOSIS — M25.519 SHOULDER PAIN, UNSPECIFIED CHRONICITY, UNSPECIFIED LATERALITY: ICD-10-CM

## 2023-10-09 RX ORDER — TRAMADOL HYDROCHLORIDE 50 MG/1
50 TABLET ORAL 3 TIMES DAILY PRN
Qty: 45 TABLET | Refills: 0 | Status: SHIPPED | OUTPATIENT
Start: 2023-10-09

## 2023-10-19 ENCOUNTER — OFFICE VISIT (OUTPATIENT)
Dept: FAMILY MEDICINE CLINIC | Facility: CLINIC | Age: 43
End: 2023-10-19
Payer: COMMERCIAL

## 2023-10-19 ENCOUNTER — APPOINTMENT (OUTPATIENT)
Dept: RADIOLOGY | Facility: CLINIC | Age: 43
End: 2023-10-19
Payer: COMMERCIAL

## 2023-10-19 VITALS
WEIGHT: 258 LBS | HEART RATE: 106 BPM | BODY MASS INDEX: 36.94 KG/M2 | OXYGEN SATURATION: 96 % | SYSTOLIC BLOOD PRESSURE: 156 MMHG | HEIGHT: 70 IN | TEMPERATURE: 97.3 F | DIASTOLIC BLOOD PRESSURE: 110 MMHG

## 2023-10-19 DIAGNOSIS — M79.89 LEFT LEG SWELLING: ICD-10-CM

## 2023-10-19 DIAGNOSIS — M79.605 LEFT LEG PAIN: ICD-10-CM

## 2023-10-19 DIAGNOSIS — M79.672 LEFT FOOT PAIN: ICD-10-CM

## 2023-10-19 DIAGNOSIS — S99.912A LEFT ANKLE INJURY, INITIAL ENCOUNTER: ICD-10-CM

## 2023-10-19 DIAGNOSIS — S99.912A LEFT ANKLE INJURY, INITIAL ENCOUNTER: Primary | ICD-10-CM

## 2023-10-19 PROCEDURE — 73610 X-RAY EXAM OF ANKLE: CPT

## 2023-10-19 PROCEDURE — 73630 X-RAY EXAM OF FOOT: CPT

## 2023-10-19 PROCEDURE — 99214 OFFICE O/P EST MOD 30 MIN: CPT

## 2023-10-19 NOTE — LETTER
October 19, 2023     Patient: Ton Dominguez  YOB: 1980  Date of Visit: 10/19/2023      To Whom it May Concern:    Messi Wall is under my professional care. Shannon Pritchett was seen in my office on 10/19/2023. Shannon Pritchett is unable to return to work until further notice. If you have any questions or concerns, please don't hesitate to call.          Sincerely,          Jacques Henderson PA-C        CC: No Recipients

## 2023-10-19 NOTE — PROGRESS NOTES
Name: Sue Cohen      : 1980      MRN: [id number]  Encounter Provider: Zayra Meek PA-C  Encounter Date: 10/19/2023   Encounter department: [address] [place] [address]     1. Left ankle injury, initial encounter  -     XR foot 3+ vw left; Future; Expected date: 10/19/2023  -     XR ankle 3+ vw left; Future; Expected date: 10/19/2023    2. Left leg swelling  -     XR foot 3+ vw left; Future; Expected date: 10/19/2023  -     XR ankle 3+ vw left; Future; Expected date: 10/19/2023  -     VAS lower limb venous duplex study, unilateral/limited; Future; Expected date: 10/19/2023    3. Left leg pain  -     XR foot 3+ vw left; Future; Expected date: 10/19/2023  -     XR ankle 3+ vw left; Future; Expected date: 10/19/2023  -     VAS lower limb venous duplex study, unilateral/limited; Future; Expected date: 10/19/2023    4. Left foot pain  -     XR foot 3+ vw left; Future; Expected date: 10/19/2023  -     XR ankle 3+ vw left; Future; Expected date: 10/19/2023    Patient presents for evaluation of left ankle pain, swelling, and bruising after an injury 7 days ago. Physical exam reveals severe swelling along with bruising of foot, ankle, all the way up to left knee. Patient also has tenderness to palpation of lateral ankle and head of 5th metatarsal. Therefore, sending patient for stat XR of foot and ankle to rule out fracture/dislocation. Patient also has extreme swelling going up to his knee - therefore ordered stat duplex to rule out DVT. Otherwise, advised patient to be NWB with the use of crutches (he has some at home) for at least the next week, use ice over the area to decrease swelling, elevate the area whenever possible, rest, and continue using pain medication as needed. Will modify treatment pending results of imaging as needed. Advised to go to ER if pain or swelling increases significantly or spreads further up his leg.         Subjective      CC: left ankle sprain The patient presents for evaluation of a left ankle pain, bruising, and swelling. Notes on Friday he tried to step down from higher elevation and rolled his left foot/ankle. Immediately after had severe swelling over lateral edge near lateral malleolus. Swelling has decreased but is still present, has bruising of the ankle and the toes along with significant pain. Pain is the worse when he bears weight on the extremity - notes when he is sitting it is a 6/10, but when he walks it is a 8/10. He has been taking Tylenol/ibuprofen and tramadol which he notes is helping. Denies any radiation of the pain up his leg or numbness of the extremity. Patient's bp elevated at today's visit - notes his BP is "always high" when he goes to the doctor, but that he thinks it is even more elevated due to the pain he is in. Notes he will monitor it at home. Review of Systems   Constitutional:  Negative for chills, diaphoresis, fatigue and fever. Respiratory:  Negative for cough, chest tightness and shortness of breath. Cardiovascular:  Positive for leg swelling (left foot, ankle, and lower leg swelling). Negative for chest pain and palpitations. Musculoskeletal:  Positive for arthralgias (left ankle) and gait problem. Skin:  Positive for color change (bruising of left toes, inner foot, and ankle). Neurological:  Negative for dizziness, light-headedness, numbness and headaches.        Current Outpatient Medications on File Prior to Visit   Medication Sig    clonazePAM (KlonoPIN) 0.5 mg tablet Take 1 tablet (0.5 mg total) by mouth every 12 (twelve) hours as needed for anxiety    allopurinol (ZYLOPRIM) 100 mg tablet TAKE 2 TABLETS BY MOUTH EVERY DAY    clobetasol (TEMOVATE) 0.05 % ointment Apply topically 2 (two) times a day    cyclobenzaprine (FLEXERIL) 5 mg tablet Take 1 tablet (5 mg total) by mouth 3 (three) times a day as needed for muscle spasms for up to 21 days    indomethacin (INDOCIN) 50 mg capsule Take 1 capsule (50 mg total) by mouth 2 (two) times a day with meals for 14 days    naloxone (NARCAN) 4 mg/0.1 mL nasal spray 0.1 mL (4 mg total) into each nostril once for 1 dose If the desired response is not obtained after 2-3 minutes, administer an additional dose using a new spray    traMADol (ULTRAM) 50 mg tablet Take 1 tablet (50 mg total) by mouth 3 (three) times a day as needed for moderate pain    [DISCONTINUED] indomethacin (INDOCIN) 50 mg capsule Take 50 mg by mouth 2 (two) times a day with meals       Objective     BP (!) 156/110   Pulse (!) 106   Temp (!) 97.3 °F (36.3 °C)   Ht 5' 10" (1.778 m)   Wt 117 kg (258 lb)   SpO2 96%   BMI 37.02 kg/m²     Physical Exam  Vitals reviewed. Constitutional:       General: He is not in acute distress. Appearance: Normal appearance. He is not ill-appearing or diaphoretic. HENT:      Head: Normocephalic and atraumatic. Nose: Nose normal.      Mouth/Throat:      Mouth: Mucous membranes are moist.   Eyes:      General:         Right eye: No discharge. Left eye: No discharge. Conjunctiva/sclera: Conjunctivae normal.   Cardiovascular:      Rate and Rhythm: Normal rate and regular rhythm. Pulses: Normal pulses. Heart sounds: Normal heart sounds. No murmur heard. Pulmonary:      Effort: Pulmonary effort is normal. No respiratory distress. Breath sounds: Normal breath sounds. No wheezing, rhonchi or rales. Musculoskeletal:      Cervical back: Normal range of motion and neck supple. Right lower leg: No swelling, tenderness or bony tenderness. Left lower leg: Swelling (moderate swelling of left upper calf down to foot) and tenderness present. No bony tenderness. Right ankle: No swelling, deformity or ecchymosis. No tenderness. Normal range of motion. Normal pulse. Left ankle: Swelling and ecchymosis (bruising around lateral ankle, inner foot, and all five toes) present. No deformity. Tenderness present.  Decreased range of motion. Abnormal pulse (unable to palpate pulse due to severe swelling). Right foot: Normal range of motion. No tenderness or bony tenderness. Normal pulse. Left foot: Normal range of motion. Swelling, tenderness and bony tenderness (tenderness on palpation of 5th MT head) present. Neurological:      Mental Status: He is alert. Sensory: No sensory deficit (gross sensation intact in bilateral LE). Motor: No weakness (stregnth testing 5/5 in bilateral LE). Gait: Gait abnormal (left sided limp due to injury).    Psychiatric:         Mood and Affect: Mood normal.       Shawn Baumgarten, PA-C

## 2023-10-20 ENCOUNTER — HOSPITAL ENCOUNTER (OUTPATIENT)
Dept: VASCULAR ULTRASOUND | Facility: HOSPITAL | Age: 43
Discharge: HOME | End: 2023-10-20
Payer: COMMERCIAL

## 2023-10-20 ENCOUNTER — RA CDI HCC (OUTPATIENT)
Dept: OTHER | Facility: HOSPITAL | Age: 43
End: 2023-10-20

## 2023-10-20 ENCOUNTER — TELEPHONE (OUTPATIENT)
Dept: FAMILY MEDICINE CLINIC | Facility: CLINIC | Age: 43
End: 2023-10-20

## 2023-10-20 DIAGNOSIS — M79.89 LEFT LEG SWELLING: ICD-10-CM

## 2023-10-20 DIAGNOSIS — M79.605 LEFT LEG PAIN: ICD-10-CM

## 2023-10-20 PROCEDURE — 93971 EXTREMITY STUDY: CPT

## 2023-10-20 NOTE — PROGRESS NOTES
[facility] coding opportunities       Chart reviewed, no opportunity found: CHART REVIEWED, NO OPPORTUNITY FOUND        Patients Insurance        Commercial Insurance: [employer]

## 2023-10-27 ENCOUNTER — OFFICE VISIT (OUTPATIENT)
Dept: FAMILY MEDICINE CLINIC | Facility: CLINIC | Age: 43
End: 2023-10-27
Payer: COMMERCIAL

## 2023-10-27 ENCOUNTER — TELEPHONE (OUTPATIENT)
Dept: FAMILY MEDICINE CLINIC | Facility: CLINIC | Age: 43
End: 2023-10-27

## 2023-10-27 ENCOUNTER — APPOINTMENT (OUTPATIENT)
Dept: RADIOLOGY | Facility: CLINIC | Age: 43
End: 2023-10-27
Payer: COMMERCIAL

## 2023-10-27 VITALS
HEIGHT: 70 IN | HEART RATE: 100 BPM | OXYGEN SATURATION: 97 % | DIASTOLIC BLOOD PRESSURE: 86 MMHG | SYSTOLIC BLOOD PRESSURE: 148 MMHG | WEIGHT: 254.2 LBS | TEMPERATURE: 97.8 F | BODY MASS INDEX: 36.39 KG/M2

## 2023-10-27 DIAGNOSIS — M25.572 ACUTE LEFT ANKLE PAIN: Primary | ICD-10-CM

## 2023-10-27 DIAGNOSIS — M25.572 ACUTE LEFT ANKLE PAIN: ICD-10-CM

## 2023-10-27 PROCEDURE — 73610 X-RAY EXAM OF ANKLE: CPT

## 2023-10-27 PROCEDURE — 99213 OFFICE O/P EST LOW 20 MIN: CPT | Performed by: NURSE PRACTITIONER

## 2023-10-27 NOTE — ASSESSMENT & PLAN NOTE
Ongoing left ankle pain and pain with walking will update x-ray of the ankle patient is improving but is still having pain and swelling is improving

## 2023-10-27 NOTE — TELEPHONE ENCOUNTER
Shannon Liz wants pt to come back for a f/u next Friday. . there are no openings that he can be scheduled in. . please advise what time she would like him to come in. . he is off that day so anytime works for her.

## 2023-10-27 NOTE — PROGRESS NOTES
Name: Jacinta Rizvi      : 1980      MRN: [id number]  Encounter Provider: MORRO Bush  Encounter Date: 10/27/2023   Encounter department: [address] [place] [address]. Acute left ankle pain  Assessment & Plan:  Ongoing left ankle pain and pain with walking will update x-ray of the ankle patient is improving but is still having pain and swelling is improving     Orders:  -     XR ankle 3+ vw left; Future; Expected date: 10/27/2023  -     Ankle Air Cast        Tobacco Cessation Counseling: Tobacco cessation counseling was provided. The patient is sincerely urged to quit consumption of tobacco. He is not ready to quit tobacco.         Subjective      Patient is here for 3 month follow up and due for his controlled substance consult. Patient sprained his left ankle and is walking with 1 crutch. Patient saw Danica Mike 1 week ago and sprain happened 2 weeks ago. Patient stepped off black top and rolled his ankle. Patient has pain while sitting 6/10. Ambulating on ankle is 8/10. Has been elevating left ankle as well as applying ice on & off. Patient has been using tylenol and tramadol for pain. He is wearing a brace on the ankle and using a crutch as best he can for walking he is having pain and is improving he is doing RICE    Ankle Injury       Review of Systems   Constitutional:  Positive for activity change. Activity change due to left ankle injury   HENT:  Positive for congestion. Patient feels congestion due to allergies depending on time of year. Eyes: Negative. Respiratory: Negative. Cardiovascular: Negative. Gastrointestinal: Negative. Endocrine: Negative. Genitourinary: Negative. Musculoskeletal:  Positive for arthralgias and neck pain. Neck pain that radiates to right shoulder   Skin: Negative. Allergic/Immunologic: Negative. Neurological: Negative. Hematological:  Bruises/bleeds easily. Bruising to left ankle   Psychiatric/Behavioral: Negative. Current Outpatient Medications on File Prior to Visit   Medication Sig   • allopurinol (ZYLOPRIM) 100 mg tablet TAKE 2 TABLETS BY MOUTH EVERY DAY   • clobetasol (TEMOVATE) 0.05 % ointment Apply topically 2 (two) times a day   • clonazePAM (KlonoPIN) 0.5 mg tablet Take 1 tablet (0.5 mg total) by mouth every 12 (twelve) hours as needed for anxiety   • indomethacin (INDOCIN) 50 mg capsule Take 1 capsule (50 mg total) by mouth 2 (two) times a day with meals for 14 days   • naloxone (NARCAN) 4 mg/0.1 mL nasal spray 0.1 mL (4 mg total) into each nostril once for 1 dose If the desired response is not obtained after 2-3 minutes, administer an additional dose using a new spray   • traMADol (ULTRAM) 50 mg tablet Take 1 tablet (50 mg total) by mouth 3 (three) times a day as needed for moderate pain   • cyclobenzaprine (FLEXERIL) 5 mg tablet Take 1 tablet (5 mg total) by mouth 3 (three) times a day as needed for muscle spasms for up to 21 days       Objective     /86 (BP Location: Left arm, Patient Position: Sitting)   Pulse 100   Temp 97.8 °F (36.6 °C) (Temporal)   Ht 5' 10" (1.778 m)   Wt 115 kg (254 lb 3.2 oz)   SpO2 97%   BMI 36.47 kg/m²     Physical Exam  Constitutional:       Appearance: Normal appearance. HENT:      Right Ear: Tympanic membrane, ear canal and external ear normal.      Left Ear: Tympanic membrane, ear canal and external ear normal.   Cardiovascular:      Rate and Rhythm: Normal rate and regular rhythm. Pulses: Normal pulses. Heart sounds: Normal heart sounds. Pulmonary:      Effort: Pulmonary effort is normal.      Breath sounds: Normal breath sounds. Musculoskeletal:         General: Swelling and tenderness present. Cervical back: Normal range of motion. Comments: Left ankle, pulse good   Neurological:      Mental Status: He is alert and oriented to person, place, and time.    Psychiatric:         Mood and Affect: Mood normal.         Behavior: Behavior normal.       Sandee Doyle

## 2023-10-29 DIAGNOSIS — M25.519 SHOULDER PAIN, UNSPECIFIED CHRONICITY, UNSPECIFIED LATERALITY: ICD-10-CM

## 2023-10-29 NOTE — PROGRESS NOTES
Name: Ton Dominguez      : 1980      MRN: [id number]  Encounter Provider: MORRO Boyd  Encounter Date: 10/27/2023   Encounter department: [address] [place] [address]. Acute left ankle pain  Assessment & Plan:  Ongoing left ankle pain and pain with walking will update x-ray of the ankle patient is improving but is still having pain and swelling is improving     Orders:  -     XR ankle 3+ vw left; Future; Expected date: 10/27/2023  -     Ankle Air Cast        Tobacco Cessation Counseling: Tobacco cessation counseling was provided. The patient is sincerely urged to quit consumption of tobacco. He is not ready to quit tobacco.         Subjective      Patient is here for 3 month follow up and due for his controlled substance consult. Patient sprained his left ankle and is walking with 1 crutch. Patient saw Salome Wyman 1 week ago and sprain happened 2 weeks ago. Patient stepped off black top and rolled his ankle. Patient has pain while sitting 6/10. Ambulating on ankle is 8/10. Has been elevating left ankle as well as applying ice on & off. Patient has been using tylenol and tramadol for pain. He is wearing a brace on the ankle and using a crutch as best he can for walking he is having pain and is improving he is doing RICE    Ankle Injury       Review of Systems   Constitutional:  Positive for activity change. Activity change due to left ankle injury   HENT:  Positive for congestion. Patient feels congestion due to allergies depending on time of year. Eyes: Negative. Respiratory: Negative. Cardiovascular: Negative. Gastrointestinal: Negative. Endocrine: Negative. Genitourinary: Negative. Musculoskeletal:  Positive for arthralgias and neck pain. Neck pain that radiates to right shoulder   Skin: Negative. Allergic/Immunologic: Negative. Neurological: Negative. Hematological:  Bruises/bleeds easily. Bruising to left ankle   Psychiatric/Behavioral: Negative. Current Outpatient Medications on File Prior to Visit   Medication Sig    allopurinol (ZYLOPRIM) 100 mg tablet TAKE 2 TABLETS BY MOUTH EVERY DAY    clobetasol (TEMOVATE) 0.05 % ointment Apply topically 2 (two) times a day    clonazePAM (KlonoPIN) 0.5 mg tablet Take 1 tablet (0.5 mg total) by mouth every 12 (twelve) hours as needed for anxiety    indomethacin (INDOCIN) 50 mg capsule Take 1 capsule (50 mg total) by mouth 2 (two) times a day with meals for 14 days    naloxone (NARCAN) 4 mg/0.1 mL nasal spray 0.1 mL (4 mg total) into each nostril once for 1 dose If the desired response is not obtained after 2-3 minutes, administer an additional dose using a new spray    traMADol (ULTRAM) 50 mg tablet Take 1 tablet (50 mg total) by mouth 3 (three) times a day as needed for moderate pain    cyclobenzaprine (FLEXERIL) 5 mg tablet Take 1 tablet (5 mg total) by mouth 3 (three) times a day as needed for muscle spasms for up to 21 days       Objective     /86 (BP Location: Left arm, Patient Position: Sitting)   Pulse 100   Temp 97.8 °F (36.6 °C) (Temporal)   Ht 5' 10" (1.778 m)   Wt 115 kg (254 lb 3.2 oz)   SpO2 97%   BMI 36.47 kg/m²     Physical Exam  Constitutional:       Appearance: Normal appearance. HENT:      Right Ear: Tympanic membrane, ear canal and external ear normal.      Left Ear: Tympanic membrane, ear canal and external ear normal.   Cardiovascular:      Rate and Rhythm: Normal rate and regular rhythm. Pulses: Normal pulses. Heart sounds: Normal heart sounds. Pulmonary:      Effort: Pulmonary effort is normal.      Breath sounds: Normal breath sounds. Musculoskeletal:         General: Swelling and tenderness present. Cervical back: Normal range of motion. Comments: Left ankle, pulse good   Neurological:      Mental Status: He is alert and oriented to person, place, and time.    Psychiatric:         Mood and Affect: Mood normal.         Behavior: Behavior normal.       Haley Lazcano

## 2023-10-30 RX ORDER — TRAMADOL HYDROCHLORIDE 50 MG/1
50 TABLET ORAL 3 TIMES DAILY PRN
Qty: 45 TABLET | Refills: 0 | Status: SHIPPED | OUTPATIENT
Start: 2023-10-30

## 2023-11-03 ENCOUNTER — TELEPHONE (OUTPATIENT)
Dept: FAMILY MEDICINE CLINIC | Facility: CLINIC | Age: 43
End: 2023-11-03

## 2023-11-03 ENCOUNTER — OFFICE VISIT (OUTPATIENT)
Dept: FAMILY MEDICINE CLINIC | Facility: CLINIC | Age: 43
End: 2023-11-03
Payer: COMMERCIAL

## 2023-11-03 VITALS
TEMPERATURE: 97.8 F | HEART RATE: 95 BPM | DIASTOLIC BLOOD PRESSURE: 86 MMHG | HEIGHT: 70 IN | OXYGEN SATURATION: 98 % | SYSTOLIC BLOOD PRESSURE: 142 MMHG | BODY MASS INDEX: 36.71 KG/M2 | WEIGHT: 256.4 LBS

## 2023-11-03 DIAGNOSIS — S93.492D SPRAIN OF ANTERIOR TALOFIBULAR LIGAMENT OF LEFT ANKLE, SUBSEQUENT ENCOUNTER: ICD-10-CM

## 2023-11-03 DIAGNOSIS — M25.572 ACUTE LEFT ANKLE PAIN: Primary | ICD-10-CM

## 2023-11-03 PROBLEM — S93.492A SPRAIN OF ANTERIOR TALOFIBULAR LIGAMENT OF LEFT ANKLE: Status: ACTIVE | Noted: 2023-11-03

## 2023-11-03 PROCEDURE — 99213 OFFICE O/P EST LOW 20 MIN: CPT | Performed by: NURSE PRACTITIONER

## 2023-11-03 NOTE — TELEPHONE ENCOUNTER
Patient forgot to ask you for a letter saying that he can return to work without restrictions on Monday 11/13/2023.

## 2023-11-03 NOTE — PROGRESS NOTES
Name: Jeffrey Alexander      : 1980      MRN: [id number]  Encounter Provider: MORRO Anderson  Encounter Date: 11/3/2023   Encounter department: [address] [place] [address]     1. Acute left ankle pain    2. Sprain of anterior talofibular ligament of left ankle, subsequent encounter  Assessment & Plan:  Patient denies any recurrence of sprain or reinjury and is still weak and painful will plan to return to work on 2023             Subjective      Patient here today for a recheck on his left ankle sprain. Patient reports that he is here today for a recheck. Patient reports that he is improving in his left ankle pain still having some soreness and is doing home exercises and is improving. He is wearing an air cast. Patient would like to plan to return to work on 2023       Review of Systems   Constitutional:  Negative for activity change, appetite change, chills, diaphoresis, fatigue, fever and unexpected weight change. HENT:  Negative for congestion, ear pain, hearing loss, postnasal drip, sinus pressure, sinus pain, sneezing and sore throat. Eyes:  Negative for pain, redness and visual disturbance. Respiratory:  Negative for cough and shortness of breath. Cardiovascular:  Negative for chest pain and leg swelling. Gastrointestinal:  Negative for abdominal pain, diarrhea, nausea and vomiting. Endocrine: Negative. Genitourinary: Negative. Musculoskeletal:  Positive for arthralgias and myalgias. Skin: Negative. Allergic/Immunologic: Negative. Neurological:  Negative for dizziness and light-headedness. Psychiatric/Behavioral:  Negative for behavioral problems and dysphoric mood.         Current Outpatient Medications on File Prior to Visit   Medication Sig   • allopurinol (ZYLOPRIM) 100 mg tablet TAKE 2 TABLETS BY MOUTH EVERY DAY   • clobetasol (TEMOVATE) 0.05 % ointment Apply topically 2 (two) times a day   • clonazePAM (KlonoPIN) 0.5 mg tablet Take 1 tablet (0.5 mg total) by mouth every 12 (twelve) hours as needed for anxiety   • cyclobenzaprine (FLEXERIL) 5 mg tablet Take 1 tablet (5 mg total) by mouth 3 (three) times a day as needed for muscle spasms for up to 21 days   • indomethacin (INDOCIN) 50 mg capsule Take 1 capsule (50 mg total) by mouth 2 (two) times a day with meals for 14 days   • traMADol (ULTRAM) 50 mg tablet Take 1 tablet (50 mg total) by mouth 3 (three) times a day as needed for moderate pain   • naloxone (NARCAN) 4 mg/0.1 mL nasal spray 0.1 mL (4 mg total) into each nostril once for 1 dose If the desired response is not obtained after 2-3 minutes, administer an additional dose using a new spray       Objective     /86 (BP Location: Left arm, Patient Position: Sitting)   Pulse 95   Temp 97.8 °F (36.6 °C) (Temporal)   Ht 5' 10" (1.778 m)   Wt 116 kg (256 lb 6.4 oz)   SpO2 98%   BMI 36.79 kg/m²     Physical Exam  Vitals reviewed. Constitutional:       General: He is not in acute distress. Appearance: He is well-developed. HENT:      Head: Normocephalic and atraumatic. Right Ear: Tympanic membrane normal.      Left Ear: Tympanic membrane normal.      Nose: Nose normal.      Mouth/Throat:      Mouth: Mucous membranes are moist.   Eyes:      Pupils: Pupils are equal, round, and reactive to light. Neck:      Thyroid: No thyromegaly. Cardiovascular:      Rate and Rhythm: Normal rate and regular rhythm. Heart sounds: Normal heart sounds. No murmur heard. Pulmonary:      Effort: Pulmonary effort is normal. No respiratory distress. Breath sounds: Normal breath sounds. No wheezing. Abdominal:      General: Bowel sounds are normal.      Palpations: Abdomen is soft. Musculoskeletal:         General: Normal range of motion. Cervical back: Normal range of motion. Legs:    Skin:     General: Skin is warm and dry.    Neurological:      Mental Status: He is alert and oriented to person, place, and time.        Marella Patch

## 2023-11-03 NOTE — ASSESSMENT & PLAN NOTE
Patient denies any recurrence of sprain or reinjury and is still weak and painful will plan to return to work on 11/13/2023

## 2023-11-22 DIAGNOSIS — F41.9 ANXIETY: ICD-10-CM

## 2023-11-22 DIAGNOSIS — M25.519 SHOULDER PAIN, UNSPECIFIED CHRONICITY, UNSPECIFIED LATERALITY: ICD-10-CM

## 2023-11-26 RX ORDER — CLONAZEPAM 0.5 MG/1
0.5 TABLET ORAL EVERY 12 HOURS PRN
Qty: 60 TABLET | Refills: 0 | Status: SHIPPED | OUTPATIENT
Start: 2023-11-26

## 2023-11-26 RX ORDER — TRAMADOL HYDROCHLORIDE 50 MG/1
50 TABLET ORAL 3 TIMES DAILY PRN
Qty: 45 TABLET | Refills: 0 | Status: SHIPPED | OUTPATIENT
Start: 2023-11-26

## 2023-12-22 DIAGNOSIS — M25.519 SHOULDER PAIN, UNSPECIFIED CHRONICITY, UNSPECIFIED LATERALITY: ICD-10-CM

## 2023-12-22 RX ORDER — TRAMADOL HYDROCHLORIDE 50 MG/1
50 TABLET ORAL 3 TIMES DAILY PRN
Qty: 45 TABLET | Refills: 0 | Status: SHIPPED | OUTPATIENT
Start: 2023-12-22

## 2024-01-18 DIAGNOSIS — M25.519 SHOULDER PAIN, UNSPECIFIED CHRONICITY, UNSPECIFIED LATERALITY: ICD-10-CM

## 2024-01-18 RX ORDER — TRAMADOL HYDROCHLORIDE 50 MG/1
50 TABLET ORAL 3 TIMES DAILY PRN
Qty: 45 TABLET | Refills: 0 | Status: SHIPPED | OUTPATIENT
Start: 2024-01-18

## 2024-02-16 DIAGNOSIS — M25.519 SHOULDER PAIN, UNSPECIFIED CHRONICITY, UNSPECIFIED LATERALITY: ICD-10-CM

## 2024-02-16 RX ORDER — TRAMADOL HYDROCHLORIDE 50 MG/1
50 TABLET ORAL 3 TIMES DAILY PRN
Qty: 45 TABLET | Refills: 0 | Status: SHIPPED | OUTPATIENT
Start: 2024-02-16

## 2024-02-19 ENCOUNTER — RA CDI HCC (OUTPATIENT)
Dept: OTHER | Facility: HOSPITAL | Age: 44
End: 2024-02-19

## 2024-02-22 DIAGNOSIS — F41.9 ANXIETY: ICD-10-CM

## 2024-02-23 RX ORDER — CLONAZEPAM 0.5 MG/1
0.5 TABLET ORAL EVERY 12 HOURS PRN
Qty: 60 TABLET | Refills: 0 | Status: SHIPPED | OUTPATIENT
Start: 2024-02-23

## 2024-03-11 NOTE — TELEPHONE ENCOUNTER
Left a message for the patient to please call the office to reschedule his appointment for Monday with Dr Catia John or he may see Donovan Wynn if he would like 
Statement Selected

## 2024-03-14 DIAGNOSIS — M25.519 SHOULDER PAIN, UNSPECIFIED CHRONICITY, UNSPECIFIED LATERALITY: ICD-10-CM

## 2024-03-15 RX ORDER — TRAMADOL HYDROCHLORIDE 50 MG/1
50 TABLET ORAL 3 TIMES DAILY PRN
Qty: 45 TABLET | Refills: 0 | Status: SHIPPED | OUTPATIENT
Start: 2024-03-15

## 2024-04-01 ENCOUNTER — OFFICE VISIT (OUTPATIENT)
Dept: FAMILY MEDICINE CLINIC | Facility: CLINIC | Age: 44
End: 2024-04-01
Payer: COMMERCIAL

## 2024-04-01 VITALS
SYSTOLIC BLOOD PRESSURE: 140 MMHG | WEIGHT: 266 LBS | OXYGEN SATURATION: 96 % | DIASTOLIC BLOOD PRESSURE: 80 MMHG | HEART RATE: 87 BPM | BODY MASS INDEX: 38.08 KG/M2 | TEMPERATURE: 98.7 F | HEIGHT: 70 IN

## 2024-04-01 DIAGNOSIS — M25.511 CHRONIC RIGHT SHOULDER PAIN: ICD-10-CM

## 2024-04-01 DIAGNOSIS — M10.9 ACUTE GOUT INVOLVING TOE OF RIGHT FOOT, UNSPECIFIED CAUSE: ICD-10-CM

## 2024-04-01 DIAGNOSIS — D22.9 CHANGE IN MULTIPLE NEVI: ICD-10-CM

## 2024-04-01 DIAGNOSIS — Z98.890 S/P SHOULDER SURGERY: Primary | ICD-10-CM

## 2024-04-01 DIAGNOSIS — F11.20 CONTINUOUS OPIOID DEPENDENCE (HCC): ICD-10-CM

## 2024-04-01 DIAGNOSIS — Z79.899 CONTROLLED SUBSTANCE AGREEMENT SIGNED: ICD-10-CM

## 2024-04-01 DIAGNOSIS — G89.29 CHRONIC RIGHT SHOULDER PAIN: ICD-10-CM

## 2024-04-01 DIAGNOSIS — M10.072 ACUTE IDIOPATHIC GOUT OF LEFT FOOT: ICD-10-CM

## 2024-04-01 PROBLEM — M25.572 ACUTE LEFT ANKLE PAIN: Status: RESOLVED | Noted: 2023-10-27 | Resolved: 2024-04-01

## 2024-04-01 PROCEDURE — 99214 OFFICE O/P EST MOD 30 MIN: CPT | Performed by: NURSE PRACTITIONER

## 2024-04-01 NOTE — PROGRESS NOTES
Assessment/Plan     Problem List Items Addressed This Visit          Musculoskeletal and Integument    Acute idiopathic gout of left foot     No gout attacks and the allopurinol is effective          Acute gout involving toe of right foot    Change in multiple nevi    Relevant Orders    Ambulatory Referral to Dermatology       Behavioral Health    Continuous opioid dependence (HCC)     Patient has pain in both shoulders and is managing his pain with the tramadol             Surgery/Wound/Pain    Chronic right shoulder pain     Taking Tramadol 50mg TID PRN, controlling his pain.         S/P shoulder surgery - Primary       Other    Controlled substance agreement signed      Treatment Plan: Chronic bilateral shoulder pain with a history of right shoulder surgery. He's completed physical therapy.     Treatment Goals: Pain control to improve the ability for activities of daily living.    Opiate risks  There are risks associated with opioid medications, including dependence, addiction and tolerance. The patient understands and agrees to use these medications only as prescribed. Potential side effects of the medications include, but are not limited to, constipation, drowsiness, addiction, impaired judgment and risk of fatal overdose if not taken as prescribed. The patient was warned against driving while taking sedation medications.  Sharing medications is a felony. At this point in time, the patient is showing no signs of addiction, abuse, diversion or suicidal ideation.      Pateint is taking concurrent benzodiazepines. Has been counseled on the risks of taking opioids and benzodiazepines including sedation, increased fall risk, dizziness, addictive potential and death.      Opioid agreement  Pain management agreement was reviewed with patient and signed/updated during visit      Drug screen  Drug screen collected during today's visit      PDMP review  [place] or [place]  reviewed. No red flags were identified; safe to  proceed with prescription      risks and benefits of treatment options, instructions for management, importance of treatment compliance, risk factor reductions, documenting in the medical record, reviewing/ordering tests, medicine, procedures and obtaining or reviewing history.         Subjective     Opioid Management:   Type of visit: Follow-up    Pain related diagnoses: Right Shoulder Pain Chronic     Aberrant behavior?: No      Adverse effects from medication?: No      Social Support System  Patient receives support from their: wife    Screening Tools/Assessments:    PHQ-2/9:  PHQ-2 score: 0    Brief Pain Inventory (BPI):  1) Throughout our lives, most of us have had pain from time to time (such as minor headaches, sprains, and toothaches). Have you had pain other than these everyday kinds of pain today? Yes  2) Where is your pain located? Rt shoulder neck arm  3) Rate your pain at its worst in the last 24 hours: 7  4) Rate your pain at its least in the last 24 hours: 1  5) Rate your average level of pain: 5  6) Rate your pain right now: 5  7) What treatments or medications are you receiving for your pain? Ultram  8) In the past 24 hours, how much relief have pain treatments or medication provided? 50%  9) During the past 24 hours, pain has interfered with your:     A) General activity: 5     B) Mood: 5     C) Walking ability: 1     D) Normal work (work outside the home & housework): 5     E) Relations with other people: 1     F) Sleep: 5     G) Enjoyment of life: 5    COMM:  Current COMM Score: 12 (positive, at risk patient)    Drug Screen:  Date of last drug screen: 4/21/2023  Drug screen result based off current prescriptions: consistent    Opioid agreement:  Active Opioid agreement on file?: Yes    Opioid agreement signed date: 4/13/2023  Opioid agreement expiration date: 4/12/2024    Naloxone:  Currently prescribed Naloxone (Narcan): No      He is here today to review and discuss his medications and  medication use. He's doing well controlling his pain.      Pain Medications               clonazePAM (KlonoPIN) 0.5 mg tablet Take 1 tablet (0.5 mg total) by mouth every 12 (twelve) hours as needed for anxiety    cyclobenzaprine (FLEXERIL) 5 mg tablet Take 1 tablet (5 mg total) by mouth 3 (three) times a day as needed for muscle spasms for up to 21 days    indomethacin (INDOCIN) 50 mg capsule Take 1 capsule (50 mg total) by mouth 2 (two) times a day with meals for 14 days    traMADol (ULTRAM) 50 mg tablet Take 1 tablet (50 mg total) by mouth 3 (three) times a day as needed for moderate pain           Outpatient Morphine Milligram Equivalents Per Day       4/1/24 and after 15 MME/Day      Order Name Dose Route Frequency Maximum MME/Day     traMADol (ULTRAM) 50 mg tablet 50 mg Oral 3 times daily PRN 15 MME/Day    Total Potential Morphine Milligram Equivalents Per Day 15 MME/Day      Calculation Information          traMADol (ULTRAM) 50 mg tablet    traMADol 50 mg Tabs: single dose of 50 mg * 3 doses per day * morphine equivalence factor of 0.1 = 15 MME/Day                                 PDMP Review         Value Time User    PDMP Reviewed  Yes 4/1/2024  4:57 PM MORRO Olguin           Review of Systems   Constitutional:  Negative for chills, fatigue and fever.   HENT: Negative.     Eyes:  Negative for pain and visual disturbance.   Respiratory:  Negative for cough, chest tightness and shortness of breath.    Cardiovascular:  Negative for chest pain, palpitations and leg swelling.   Gastrointestinal:  Negative for abdominal pain, constipation, diarrhea, nausea and vomiting.   Endocrine: Negative.    Genitourinary:  Negative for dysuria, frequency, hematuria and urgency.   Musculoskeletal:  Positive for arthralgias and myalgias. Negative for back pain, gait problem, joint swelling, neck pain and neck stiffness.   Skin:  Negative for color change and rash.        Multiple nevi   Allergic/Immunologic:  "Negative.    Neurological:  Negative for dizziness, seizures, syncope, weakness, light-headedness and headaches.   Hematological: Negative.    Psychiatric/Behavioral: Negative.          Anxiety   All other systems reviewed and are negative.    Objective     /80   Pulse 87   Temp 98.7 °F (37.1 °C)   Ht 5' 10\" (1.778 m)   Wt 121 kg (266 lb)   SpO2 96%   BMI 38.17 kg/m²     Physical Exam  Vitals and nursing note reviewed.   Constitutional:       Appearance: Normal appearance.   HENT:      Head: Normocephalic and atraumatic.      Right Ear: Tympanic membrane normal.      Left Ear: Tympanic membrane normal.      Nose: Nose normal.      Mouth/Throat:      Pharynx: Oropharynx is clear.   Eyes:      Conjunctiva/sclera: Conjunctivae normal.   Cardiovascular:      Rate and Rhythm: Normal rate and regular rhythm.      Pulses: Normal pulses.      Heart sounds: Normal heart sounds.   Pulmonary:      Effort: Pulmonary effort is normal.      Breath sounds: Normal breath sounds.   Abdominal:      General: Bowel sounds are normal.      Palpations: Abdomen is soft.   Musculoskeletal:      Right shoulder: Tenderness present. Decreased range of motion.      Left shoulder: Tenderness present. Decreased range of motion.      Cervical back: Normal range of motion.   Skin:     General: Skin is warm and dry.   Neurological:      Mental Status: He is alert and oriented to person, place, and time.   Psychiatric:         Mood and Affect: Mood normal.         Behavior: Behavior normal.         Thought Content: Thought content normal.         Judgment: Judgment normal.         MORRO Olguin          "

## 2024-04-04 LAB
6-BETA NALTREXOL UR CFM-MCNC: NEGATIVE NG/ML
6MAM UR QL: NEGATIVE NG/ML
AMPHETAMINES UR QL: NEGATIVE NG/ML
BARBITURATES UR QL: NEGATIVE NG/ML
BENZODIAZ UR QL: NEGATIVE NG/ML
BUPRENORPHINE UR QL: NEGATIVE NG/ML
BUTYLONE: NEGATIVE NG/ML
BZE UR QL: NEGATIVE NG/ML
CREAT UR-MCNC: 65.5 MG/DL
ETHANOL UR QL: NEGATIVE NG/ML
FENTANYL: NEGATIVE NG/ML
GABAPENTIN UR-MCNC: NEGATIVE NG/ML
MDPV: NEGATIVE NG/ML
MEPHEDRONE: NEGATIVE NG/ML
METHADONE UR QL: NEGATIVE NG/ML
METHYLONE: NEGATIVE NG/ML
NALTREXONE UR-MCNC: NEGATIVE NG/ML
OPIATES UR QL: NEGATIVE NG/ML
OXIDANTS UR QL: NEGATIVE MCG/ML
OXYCODONE UR QL: NEGATIVE NG/ML
PCP UR QL: NEGATIVE NG/ML
PH UR: 5.5 [PH] (ref 4.5–9)
SL AMB PREGABALIN: NEGATIVE NG/ML
SL AMB RITALINIC ACID: NEGATIVE NG/ML
THC UR QL: NEGATIVE NG/ML

## 2024-04-08 DIAGNOSIS — M25.519 SHOULDER PAIN, UNSPECIFIED CHRONICITY, UNSPECIFIED LATERALITY: ICD-10-CM

## 2024-04-09 RX ORDER — TRAMADOL HYDROCHLORIDE 50 MG/1
50 TABLET ORAL 3 TIMES DAILY PRN
Qty: 45 TABLET | Refills: 0 | Status: SHIPPED | OUTPATIENT
Start: 2024-04-09

## 2024-04-15 DIAGNOSIS — M10.9 ACUTE GOUT INVOLVING TOE OF RIGHT FOOT, UNSPECIFIED CAUSE: ICD-10-CM

## 2024-04-15 RX ORDER — ALLOPURINOL 100 MG/1
200 TABLET ORAL DAILY
Qty: 180 TABLET | Refills: 1 | Status: SHIPPED | OUTPATIENT
Start: 2024-04-15

## 2024-05-02 DIAGNOSIS — M25.519 SHOULDER PAIN, UNSPECIFIED CHRONICITY, UNSPECIFIED LATERALITY: ICD-10-CM

## 2024-05-03 RX ORDER — TRAMADOL HYDROCHLORIDE 50 MG/1
50 TABLET ORAL 3 TIMES DAILY PRN
Qty: 45 TABLET | Refills: 0 | Status: SHIPPED | OUTPATIENT
Start: 2024-05-03

## 2024-05-21 DIAGNOSIS — F41.9 ANXIETY: ICD-10-CM

## 2024-05-22 RX ORDER — CLONAZEPAM 0.5 MG/1
0.5 TABLET ORAL EVERY 12 HOURS PRN
Qty: 60 TABLET | Refills: 0 | Status: SHIPPED | OUTPATIENT
Start: 2024-05-22

## 2024-05-26 DIAGNOSIS — M25.519 SHOULDER PAIN, UNSPECIFIED CHRONICITY, UNSPECIFIED LATERALITY: ICD-10-CM

## 2024-05-28 RX ORDER — TRAMADOL HYDROCHLORIDE 50 MG/1
50 TABLET ORAL 3 TIMES DAILY PRN
Qty: 45 TABLET | Refills: 0 | Status: SHIPPED | OUTPATIENT
Start: 2024-05-28

## 2024-06-12 ENCOUNTER — APPOINTMENT (OUTPATIENT)
Dept: URGENT CARE | Facility: MEDICAL CENTER | Age: 44
End: 2024-06-12

## 2024-06-22 DIAGNOSIS — M25.519 SHOULDER PAIN, UNSPECIFIED CHRONICITY, UNSPECIFIED LATERALITY: ICD-10-CM

## 2024-06-24 RX ORDER — TRAMADOL HYDROCHLORIDE 50 MG/1
50 TABLET ORAL 3 TIMES DAILY PRN
Qty: 45 TABLET | Refills: 0 | Status: SHIPPED | OUTPATIENT
Start: 2024-06-24

## 2024-06-28 ENCOUNTER — RA CDI HCC (OUTPATIENT)
Dept: OTHER | Facility: HOSPITAL | Age: 44
End: 2024-06-28

## 2024-07-08 ENCOUNTER — OFFICE VISIT (OUTPATIENT)
Dept: FAMILY MEDICINE CLINIC | Facility: CLINIC | Age: 44
End: 2024-07-08
Payer: COMMERCIAL

## 2024-07-08 VITALS
HEIGHT: 70 IN | OXYGEN SATURATION: 97 % | DIASTOLIC BLOOD PRESSURE: 88 MMHG | WEIGHT: 261 LBS | BODY MASS INDEX: 37.37 KG/M2 | SYSTOLIC BLOOD PRESSURE: 142 MMHG | TEMPERATURE: 97.3 F | HEART RATE: 89 BPM

## 2024-07-08 DIAGNOSIS — M25.511 CHRONIC RIGHT SHOULDER PAIN: Primary | ICD-10-CM

## 2024-07-08 DIAGNOSIS — G89.29 CHRONIC RIGHT SHOULDER PAIN: Primary | ICD-10-CM

## 2024-07-08 DIAGNOSIS — M1A.2710 DRUG-INDUCED CHRONIC GOUT OF RIGHT FOOT WITHOUT TOPHUS: ICD-10-CM

## 2024-07-08 PROBLEM — M10.9 ACUTE GOUT INVOLVING TOE OF RIGHT FOOT: Status: RESOLVED | Noted: 2022-04-21 | Resolved: 2024-07-08

## 2024-07-08 PROBLEM — M10.072 ACUTE IDIOPATHIC GOUT OF LEFT FOOT: Status: RESOLVED | Noted: 2021-02-22 | Resolved: 2024-07-08

## 2024-07-08 PROCEDURE — 99213 OFFICE O/P EST LOW 20 MIN: CPT | Performed by: NURSE PRACTITIONER

## 2024-07-08 NOTE — PROGRESS NOTES
Ambulatory Visit  Name: Nehemiah Greene      : 1980      MRN: [id number]  Encounter Provider: MORRO Olguin  Encounter Date: 2024   Encounter department: [facility]    Assessment & Plan   1. Chronic right shoulder pain  Assessment & Plan:  Taking tramadol 50mg TID, but only using 1-2 per day for pain. Will give shoulder exercises per patient request. Patient anticipated shoulder surgery, but is not currently ready to move forward in this. Discusses possible injections, patient is hesitance to do so.  2. 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 He's still having a difficult time with his right shoulder pain. He is anticipating a right shoulder replacement, but does not have the sick time in order to do so. He hears and feels a lot of popping and clicking in the shoulder with use. He's noticed he's been needing the pain medication a little more than once a day at times. He typically does take 1 a day. He's also been getting frustrated with his sinuses, he thinks that his septum is deviated.      Review of Systems   Constitutional:  Negative for chills, fatigue and fever.   HENT:  Positive for congestion, postnasal drip and rhinorrhea. Negative for nosebleeds, sinus pressure, sinus pain, sore throat and tinnitus.    Eyes:  Negative for pain and visual disturbance.   Respiratory:  Negative for cough, chest tightness and shortness of breath.    Cardiovascular:  Negative for chest pain, palpitations and leg swelling.   Gastrointestinal: Negative.    Endocrine: Negative.    Genitourinary:  Negative for dysuria and hematuria.   Musculoskeletal:  Positive for arthralgias. Negative for back pain, gait problem, joint swelling, myalgias, neck pain and neck stiffness.   Skin:   Negative for color change and rash.   Allergic/Immunologic: Negative.    Neurological:  Negative for dizziness, seizures, syncope and headaches.   Hematological: Negative.    Psychiatric/Behavioral: Negative.     All other systems reviewed and are negative.    Past Medical History:   Diagnosis Date    Acute gout involving toe of right foot 04/21/2022    Acute idiopathic gout of left foot 02/22/2021    Anxiety     Concussion     Pyloric stenosis      Past Surgical History:   Procedure Laterality Date    HERNIA REPAIR      SHOULDER ARTHROSCOPY Right     SPINAL FUSION       Family History   Problem Relation Age of Onset    Lung cancer Father     Other Father         Back disor    Sjogren's syndrome Mother      Social History     Tobacco Use    Smoking status: Light Smoker     Types: Cigars    Smokeless tobacco: Never   Vaping Use    Vaping status: Never Used   Substance and Sexual Activity    Alcohol use: Yes     Alcohol/week: 7.0 standard drinks of alcohol     Types: 7 Cans of beer per week    Drug use: Never    Sexual activity: Not on file     Current Outpatient Medications on File Prior to Visit   Medication Sig    allopurinol (ZYLOPRIM) 100 mg tablet TAKE 2 TABLETS BY MOUTH EVERY DAY    clobetasol (TEMOVATE) 0.05 % ointment Apply topically 2 (two) times a day    clonazePAM (KlonoPIN) 0.5 mg tablet Take 1 tablet (0.5 mg total) by mouth every 12 (twelve) hours as needed for anxiety    cyclobenzaprine (FLEXERIL) 5 mg tablet Take 1 tablet (5 mg total) by mouth 3 (three) times a day as needed for muscle spasms for up to 21 days    indomethacin (INDOCIN) 50 mg capsule Take 1 capsule (50 mg total) by mouth 2 (two) times a day with meals for 14 days    naloxone (NARCAN) 4 mg/0.1 mL nasal spray 0.1 mL (4 mg total) into each nostril once for 1 dose If the desired response is not obtained after 2-3 minutes, administer an additional dose using a new spray    traMADol (ULTRAM) 50 mg tablet Take 1 tablet (50 mg total) by mouth 3  "(three) times a day as needed for moderate pain     Allergies   Allergen Reactions    Other Abdominal Pain     pollen    Pollen Extract Cough     Immunization History   Administered Date(s) Administered    COVID-19 MODERNA VACC 0.5 ML IM 04/30/2021, 05/28/2021    DTP 1980, 1980, 1980, 10/01/1982, 04/18/1986    Influenza, injectable, quadrivalent, preservative free 0.5 mL 12/12/2019    MMR 06/17/1982    OPV 1981, 1981, 02/09/1982, 10/01/1984, 04/18/1986    TD (adult) Preservative Free 01/30/1993    Tdap 12/12/2019     Objective     /88 (BP Location: Left arm, Patient Position: Sitting, Cuff Size: Large)   Pulse 89   Temp (!) 97.3 °F (36.3 °C)   Ht 5' 10\" (1.778 m)   Wt 118 kg (261 lb)   SpO2 97%   BMI 37.45 kg/m²     Physical Exam  Vitals and nursing note reviewed.   Constitutional:       General: He is not in acute distress.     Appearance: He is well-developed.   HENT:      Head: Normocephalic and atraumatic.      Right Ear: Tympanic membrane normal.      Left Ear: Tympanic membrane normal.      Nose: Nose normal.      Comments: Narrow nasal passage     Mouth/Throat:      Pharynx: Oropharynx is clear.   Eyes:      Conjunctiva/sclera: Conjunctivae normal.   Cardiovascular:      Rate and Rhythm: Normal rate and regular rhythm.      Heart sounds: No murmur heard.  Pulmonary:      Effort: Pulmonary effort is normal. No respiratory distress.      Breath sounds: Normal breath sounds.   Abdominal:      General: Abdomen is flat.      Palpations: Abdomen is soft.      Tenderness: There is no abdominal tenderness.   Musculoskeletal:         General: No swelling.      Right shoulder: Tenderness present. Decreased range of motion.      Cervical back: Neck supple.   Skin:     General: Skin is warm and dry.      Capillary Refill: Capillary refill takes less than 2 seconds.   Neurological:      Mental Status: He is alert and oriented to person, place, and time.   Psychiatric:         Mood " and Affect: Mood normal.       Administrative Statements

## 2024-07-08 NOTE — ASSESSMENT & PLAN NOTE
Taking tramadol 50mg TID, but only using 1-2 per day for pain. Will give shoulder exercises per patient request. Patient anticipated shoulder surgery, but is not currently ready to move forward in this. Discusses possible injections, patient is hesitance to do so.

## 2024-07-08 NOTE — PROGRESS NOTES
Ambulatory Visit  Name: Nehemiah Greene      : 1980      MRN: [id number]  Encounter Provider: MORRO Olguin  Encounter Date: 2024   Encounter department: [facility]    Assessment & Plan   1. Chronic right shoulder pain  Assessment & Plan:  Taking tramadol 50mg TID, but only using 1-2 per day for pain. Will give shoulder exercises per patient request. Patient anticipated shoulder surgery, but is not currently ready to move forward in this. Discusses possible injections, patient is hesitance to do so.  2. 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 He's still having a difficult time with his right shoulder pain. He is anticipating a right shoulder replacement, but does not have the sick time in order to do so. He hears and feels a lot of popping and clicking in the shoulder with use. He's noticed he's been needing the pain medication a little more than once a day at times. He typically does take 1 a day. He's also been getting frustrated with his sinuses, he thinks that his septum is deviated.      Review of Systems   Constitutional:  Negative for chills, fatigue and fever.   HENT:  Positive for congestion, postnasal drip and rhinorrhea. Negative for nosebleeds, sinus pressure, sinus pain, sore throat and tinnitus.    Eyes:  Negative for pain and visual disturbance.   Respiratory:  Negative for cough, chest tightness and shortness of breath.    Cardiovascular:  Negative for chest pain, palpitations and leg swelling.   Gastrointestinal: Negative.    Endocrine: Negative.    Genitourinary:  Negative for dysuria and hematuria.   Musculoskeletal:  Positive for arthralgias. Negative for back  pain, gait problem, joint swelling, myalgias, neck pain and neck stiffness.   Skin:  Negative for color change and rash.   Allergic/Immunologic: Negative.    Neurological:  Negative for dizziness, seizures, syncope and headaches.   Hematological: Negative.    Psychiatric/Behavioral: Negative.     All other systems reviewed and are negative.    Past Medical History:   Diagnosis Date   • Acute gout involving toe of right foot 04/21/2022   • Acute idiopathic gout of left foot 02/22/2021   • Anxiety    • Concussion    • Pyloric stenosis      Past Surgical History:   Procedure Laterality Date   • HERNIA REPAIR     • SHOULDER ARTHROSCOPY Right    • SPINAL FUSION       Family History   Problem Relation Age of Onset   • Lung cancer Father    • Other Father         Back disor   • Sjogren's syndrome Mother      Social History     Tobacco Use   • Smoking status: Light Smoker     Types: Cigars   • Smokeless tobacco: Never   Vaping Use   • Vaping status: Never Used   Substance and Sexual Activity   • Alcohol use: Yes     Alcohol/week: 7.0 standard drinks of alcohol     Types: 7 Cans of beer per week   • Drug use: Never   • Sexual activity: Not on file     Current Outpatient Medications on File Prior to Visit   Medication Sig   • allopurinol (ZYLOPRIM) 100 mg tablet TAKE 2 TABLETS BY MOUTH EVERY DAY   • clobetasol (TEMOVATE) 0.05 % ointment Apply topically 2 (two) times a day   • clonazePAM (KlonoPIN) 0.5 mg tablet Take 1 tablet (0.5 mg total) by mouth every 12 (twelve) hours as needed for anxiety   • cyclobenzaprine (FLEXERIL) 5 mg tablet Take 1 tablet (5 mg total) by mouth 3 (three) times a day as needed for muscle spasms for up to 21 days   • indomethacin (INDOCIN) 50 mg capsule Take 1 capsule (50 mg total) by mouth 2 (two) times a day with meals for 14 days   • naloxone (NARCAN) 4 mg/0.1 mL nasal spray 0.1 mL (4 mg total) into each nostril once for 1 dose If the desired response is not obtained after 2-3 minutes, administer  "an additional dose using a new spray   • traMADol (ULTRAM) 50 mg tablet Take 1 tablet (50 mg total) by mouth 3 (three) times a day as needed for moderate pain     Allergies   Allergen Reactions   • Other Abdominal Pain     pollen   • Pollen Extract Cough     Immunization History   Administered Date(s) Administered   • COVID-19 MODERNA VACC 0.5 ML IM 04/30/2021, 05/28/2021   • DTP 1980, 1981, 1981, 10/01/1982, 04/18/1986   • Influenza, injectable, quadrivalent, preservative free 0.5 mL 12/12/2019   • MMR 06/17/1982   • OPV 1981, 1981, 02/09/1982, 10/01/1984, 04/18/1986   • TD (adult) Preservative Free 01/30/1993   • Tdap 12/12/2019     Objective   {Disappearing Hyperlinks   Review Vitals * Enter New Vitals * Results Review * Labs * Imaging * Cardiology * Procedures * Lung Cancer Screening :65805}  /88 (BP Location: Left arm, Patient Position: Sitting, Cuff Size: Large)   Pulse 89   Temp (!) 97.3 °F (36.3 °C)   Ht 5' 10\" (1.778 m)   Wt 118 kg (261 lb)   SpO2 97%   BMI 37.45 kg/m²     Physical Exam  Vitals and nursing note reviewed.   Constitutional:       General: He is not in acute distress.     Appearance: He is well-developed.   HENT:      Head: Normocephalic and atraumatic.      Right Ear: Tympanic membrane normal.      Left Ear: Tympanic membrane normal.      Nose: Nose normal.      Comments: Narrow nasal passage     Mouth/Throat:      Pharynx: Oropharynx is clear.   Eyes:      Conjunctiva/sclera: Conjunctivae normal.   Cardiovascular:      Rate and Rhythm: Normal rate and regular rhythm.      Heart sounds: No murmur heard.  Pulmonary:      Effort: Pulmonary effort is normal. No respiratory distress.      Breath sounds: Normal breath sounds.   Abdominal:      General: Abdomen is flat.      Palpations: Abdomen is soft.      Tenderness: There is no abdominal tenderness.   Musculoskeletal:         General: No swelling.      Right shoulder: Tenderness present. Decreased " range of motion.      Cervical back: Neck supple.   Skin:     General: Skin is warm and dry.      Capillary Refill: Capillary refill takes less than 2 seconds.   Neurological:      Mental Status: He is alert and oriented to person, place, and time.   Psychiatric:         Mood and Affect: Mood normal.       Administrative Statements {Disappearing Hyperlinks I  Level of Service * [facility]/PCSP:59909}

## 2024-07-13 DIAGNOSIS — M25.519 SHOULDER PAIN, UNSPECIFIED CHRONICITY, UNSPECIFIED LATERALITY: ICD-10-CM

## 2024-07-15 RX ORDER — TRAMADOL HYDROCHLORIDE 50 MG/1
50 TABLET ORAL 3 TIMES DAILY PRN
Qty: 45 TABLET | Refills: 0 | Status: SHIPPED | OUTPATIENT
Start: 2024-07-15

## 2024-08-02 DIAGNOSIS — M25.519 SHOULDER PAIN, UNSPECIFIED CHRONICITY, UNSPECIFIED LATERALITY: ICD-10-CM

## 2024-08-02 RX ORDER — TRAMADOL HYDROCHLORIDE 50 MG/1
50 TABLET ORAL 3 TIMES DAILY PRN
Qty: 45 TABLET | Refills: 0 | Status: SHIPPED | OUTPATIENT
Start: 2024-08-02

## 2024-08-20 DIAGNOSIS — M25.519 SHOULDER PAIN, UNSPECIFIED CHRONICITY, UNSPECIFIED LATERALITY: ICD-10-CM

## 2024-08-20 RX ORDER — TRAMADOL HYDROCHLORIDE 50 MG/1
50 TABLET ORAL 3 TIMES DAILY PRN
Qty: 45 TABLET | Refills: 0 | Status: SHIPPED | OUTPATIENT
Start: 2024-08-20

## 2024-09-02 DIAGNOSIS — F41.9 ANXIETY: ICD-10-CM

## 2024-09-03 RX ORDER — CLONAZEPAM 0.5 MG/1
0.5 TABLET ORAL EVERY 12 HOURS PRN
Qty: 60 TABLET | Refills: 0 | Status: SHIPPED | OUTPATIENT
Start: 2024-09-03

## 2024-09-11 DIAGNOSIS — M25.519 SHOULDER PAIN, UNSPECIFIED CHRONICITY, UNSPECIFIED LATERALITY: ICD-10-CM

## 2024-09-11 RX ORDER — TRAMADOL HYDROCHLORIDE 50 MG/1
50 TABLET ORAL 3 TIMES DAILY PRN
Qty: 45 TABLET | Refills: 0 | Status: SHIPPED | OUTPATIENT
Start: 2024-09-11

## 2024-09-30 DIAGNOSIS — M25.519 SHOULDER PAIN, UNSPECIFIED CHRONICITY, UNSPECIFIED LATERALITY: ICD-10-CM

## 2024-09-30 RX ORDER — TRAMADOL HYDROCHLORIDE 50 MG/1
50 TABLET ORAL 3 TIMES DAILY PRN
Qty: 45 TABLET | Refills: 0 | Status: SHIPPED | OUTPATIENT
Start: 2024-09-30

## 2024-10-08 ENCOUNTER — RA CDI HCC (OUTPATIENT)
Dept: OTHER | Facility: HOSPITAL | Age: 44
End: 2024-10-08

## 2024-10-11 DIAGNOSIS — M10.9 ACUTE GOUT INVOLVING TOE OF RIGHT FOOT, UNSPECIFIED CAUSE: ICD-10-CM

## 2024-10-11 RX ORDER — ALLOPURINOL 100 MG/1
200 TABLET ORAL DAILY
Qty: 180 TABLET | Refills: 1 | Status: SHIPPED | OUTPATIENT
Start: 2024-10-11

## 2024-10-15 ENCOUNTER — OFFICE VISIT (OUTPATIENT)
Dept: FAMILY MEDICINE CLINIC | Facility: CLINIC | Age: 44
End: 2024-10-15
Payer: COMMERCIAL

## 2024-10-15 VITALS
SYSTOLIC BLOOD PRESSURE: 138 MMHG | WEIGHT: 249 LBS | HEIGHT: 70 IN | OXYGEN SATURATION: 97 % | DIASTOLIC BLOOD PRESSURE: 80 MMHG | BODY MASS INDEX: 35.65 KG/M2 | HEART RATE: 80 BPM | TEMPERATURE: 97.8 F

## 2024-10-15 DIAGNOSIS — Z00.00 ANNUAL PHYSICAL EXAM: Primary | ICD-10-CM

## 2024-10-15 PROCEDURE — 99396 PREV VISIT EST AGE 40-64: CPT | Performed by: NURSE PRACTITIONER

## 2024-10-15 NOTE — PROGRESS NOTES
Adult Annual Physical  Name: Nehemiah Greene      : 1980      MRN: [id number]  Encounter Provider: MORRO Olguin  Encounter Date: 10/15/2024   Encounter department: [facility]    Assessment & Plan  Annual physical exam         Immunizations and preventive care screenings were discussed with patient today. Appropriate education was printed on patient's after visit summary.        Counseling:  Injury prevention: discussed safety/seat belts, safety helmets, smoke detectors, carbon monoxide detectors, and smoking near bedding or upholstery.  Exercise: the importance of regular exercise/physical activity was discussed. Recommend exercise 3-5 times per week for at least 30 minutes.          History of Present Illness     Adult Annual Physical:  Patient presents for annual physical. Patient here today and reports that he is not doing the exercises that he knows he should and is not seeing orthopedics at this point for his shoulder. .     Diet and Physical Activity:  - Diet/Nutrition: well balanced diet and low carb diet. cutting back his carbs and lost 11 pounds  - Exercise: no formal exercise.    Depression Screening:  - PHQ-2 Score: 0    General Health:  - Sleep: sleeps poorly. related to his shoulder pain and sinus congestion  - Hearing: normal hearing bilateral ears.  - Vision: most recent eye exam > 1 year ago.  - Dental: brushes teeth twice daily and regular dental visits.     Health:    - Urinary symptoms: none.     Review of Systems   Constitutional:  Negative for activity change, appetite change, chills, diaphoresis, fatigue, fever and unexpected weight change.   HENT:  Negative for congestion, ear pain, hearing loss, postnasal drip, sinus pressure, sinus pain, sneezing and sore throat.    Eyes:  Negative for pain, redness and visual disturbance.   Respiratory:  Negative for cough and shortness of breath.    Cardiovascular:  Negative for chest pain and leg swelling.  "  Gastrointestinal:  Negative for abdominal pain, diarrhea, nausea and vomiting.   Endocrine: Negative.    Genitourinary: Negative.    Musculoskeletal:  Positive for arthralgias and neck pain.   Skin: Negative.    Allergic/Immunologic: Negative.    Neurological:  Negative for dizziness and light-headedness.   Hematological: Negative.    Psychiatric/Behavioral:  Negative for behavioral problems and dysphoric mood.          Objective     /80   Pulse 80   Temp 97.8 °F (36.6 °C)   Ht 5' 10\" (1.778 m)   Wt 113 kg (249 lb)   SpO2 97%   BMI 35.73 kg/m²     Physical Exam  Constitutional:       General: He is not in acute distress.     Appearance: He is well-developed.   HENT:      Head: Normocephalic and atraumatic.   Eyes:      Pupils: Pupils are equal, round, and reactive to light.   Neck:      Thyroid: No thyromegaly.   Cardiovascular:      Rate and Rhythm: Normal rate and regular rhythm.      Heart sounds: Normal heart sounds. No murmur heard.  Pulmonary:      Effort: Pulmonary effort is normal. No respiratory distress.      Breath sounds: Normal breath sounds. No wheezing.   Abdominal:      General: Bowel sounds are normal.      Palpations: Abdomen is soft.   Musculoskeletal:         General: Normal range of motion.      Cervical back: Normal range of motion.   Skin:     General: Skin is warm and dry.   Neurological:      Mental Status: He is alert and oriented to person, place, and time.       "

## 2024-10-15 NOTE — PATIENT INSTRUCTIONS
"Patient Education     Routine physical for adults   The Basics   Written by the doctors and editors at [facility]   What is a physical? -- A physical is a routine visit, or \"check-up,\" with your doctor. You might also hear it called a \"wellness visit\" or \"preventive visit.\"  During each visit, the doctor will:   Ask about your physical and mental health   Ask about your habits, behaviors, and lifestyle   Do an exam   Give you vaccines if needed   Talk to you about any medicines you take   Give advice about your health   Answer your questions  Getting regular check-ups is an important part of taking care of your health. It can help your doctor find and treat any problems you have. But it's also important for preventing health problems.  A routine physical is different from a \"sick visit.\" A sick visit is when you see a doctor because of a health concern or problem. Since physicals are scheduled ahead of time, you can think about what you want to ask the doctor.  How often should I get a physical? -- It depends on your age and health. In general, for people age 21 years and older:   If you are younger than 50 years, you might be able to get a physical every 3 years.   If you are 50 years or older, your doctor might recommend a physical every year.  If you have an ongoing health condition, like diabetes or high blood pressure, your doctor will probably want to see you more often.  What happens during a physical? -- In general, each visit will include:   Physical exam - The doctor or nurse will check your height, weight, heart rate, and blood pressure. They will also look at your eyes and ears. They will ask about how you are feeling and whether you have any symptoms that bother you.   Medicines - It's a good idea to bring a list of all the medicines you take to each doctor visit. Your doctor will talk to you about your medicines and answer any questions. Tell them if you are having any side effects that bother you. You " "should also tell them if you are having trouble paying for any of your medicines.   Habits and behaviors - This includes:   Your diet   Your exercise habits   Whether you smoke, drink alcohol, or use drugs   Whether you are sexually active   Whether you feel safe at home  Your doctor will talk to you about things you can do to improve your health and lower your risk of health problems. They will also offer help and support. For example, if you want to quit smoking, they can give you advice and might prescribe medicines. If you want to improve your diet or get more physical activity, they can help you with this, too.   Lab tests, if needed - The tests you get will depend on your age and situation. For example, your doctor might want to check your:   Cholesterol   Blood sugar   Iron level   Vaccines - The recommended vaccines will depend on your age, health, and what vaccines you already had. Vaccines are very important because they can prevent certain serious or deadly infections.   Discussion of screening - \"Screening\" means checking for diseases or other health problems before they cause symptoms. Your doctor can recommend screening based on your age, risk, and preferences. This might include tests to check for:   Cancer, such as breast, prostate, cervical, ovarian, colorectal, prostate, lung, or skin cancer   Sexually transmitted infections, such as chlamydia and gonorrhea   Mental health conditions like depression and anxiety  Your doctor will talk to you about the different types of screening tests. They can help you decide which screenings to have. They can also explain what the results might mean.   Answering questions - The physical is a good time to ask the doctor or nurse questions about your health. If needed, they can refer you to other doctors or specialists, too.  Adults older than 65 years often need other care, too. As you get older, your doctor will talk to you about:   How to prevent falling at " home   Hearing or vision tests   Memory testing   How to take your medicines safely   Making sure that you have the help and support you need at home  All topics are updated as new evidence becomes available and our peer review process is complete.  This topic retrieved from [employer] on: May 02, 2024.  Topic 360777 Version 1.0  Release: 32.4.3 - C32.122  © 2024 UpToDate, Inc. and/or its affiliates. All rights reserved.  Consumer Information Use and Disclaimer   Disclaimer: This generalized information is a limited summary of diagnosis, treatment, and/or medication information. It is not meant to be comprehensive and should be used as a tool to help the user understand and/or assess potential diagnostic and treatment options. It does NOT include all information about conditions, treatments, medications, side effects, or risks that may apply to a specific patient. It is not intended to be medical advice or a substitute for the medical advice, diagnosis, or treatment of a health care provider based on the health care provider's examination and assessment of a patient's specific and unique circumstances. Patients must speak with a health care provider for complete information about their health, medical questions, and treatment options, including any risks or benefits regarding use of medications. This information does not endorse any treatments or medications as safe, effective, or approved for treating a specific patient. UpToDate, Inc. and its affiliates disclaim any warranty or liability relating to this information or the use thereof.The use of this information is governed by the Terms of Use, available at https://www.woltersThe Cod[employer] Companyuwer.com/en/know/clinical-effectiveness-terms. 2024© UpToDate, Inc. and its affiliates and/or licensors. All rights reserved.  Copyright   © 2024 UpToDate, Inc. and/or its affiliates. All rights reserved.

## 2024-10-19 DIAGNOSIS — M25.519 SHOULDER PAIN, UNSPECIFIED CHRONICITY, UNSPECIFIED LATERALITY: ICD-10-CM

## 2024-10-21 RX ORDER — TRAMADOL HYDROCHLORIDE 50 MG/1
50 TABLET ORAL 3 TIMES DAILY PRN
Qty: 45 TABLET | Refills: 0 | Status: SHIPPED | OUTPATIENT
Start: 2024-10-21

## 2024-11-07 DIAGNOSIS — M25.519 SHOULDER PAIN, UNSPECIFIED CHRONICITY, UNSPECIFIED LATERALITY: ICD-10-CM

## 2024-11-07 DIAGNOSIS — F41.9 ANXIETY: ICD-10-CM

## 2024-11-08 RX ORDER — CLONAZEPAM 0.5 MG/1
0.5 TABLET ORAL EVERY 12 HOURS PRN
Qty: 60 TABLET | Refills: 0 | Status: SHIPPED | OUTPATIENT
Start: 2024-11-08

## 2024-11-08 RX ORDER — TRAMADOL HYDROCHLORIDE 50 MG/1
50 TABLET ORAL 3 TIMES DAILY PRN
Qty: 45 TABLET | Refills: 0 | Status: SHIPPED | OUTPATIENT
Start: 2024-11-08

## 2024-11-26 DIAGNOSIS — M25.519 SHOULDER PAIN, UNSPECIFIED CHRONICITY, UNSPECIFIED LATERALITY: ICD-10-CM

## 2024-11-26 RX ORDER — TRAMADOL HYDROCHLORIDE 50 MG/1
50 TABLET ORAL 3 TIMES DAILY PRN
Qty: 45 TABLET | Refills: 0 | Status: SHIPPED | OUTPATIENT
Start: 2024-11-26

## 2024-12-08 ENCOUNTER — RESULTS FOLLOW-UP (OUTPATIENT)
Dept: FAMILY MEDICINE CLINIC | Facility: CLINIC | Age: 44
End: 2024-12-08

## 2024-12-08 LAB
ALBUMIN SERPL-MCNC: 4.7 G/DL (ref 3.6–5.1)
ALBUMIN/GLOB SERPL: 1.5 (CALC) (ref 1–2.5)
ALP SERPL-CCNC: 55 U/L (ref 36–130)
ALT SERPL-CCNC: 28 U/L (ref 9–46)
AST SERPL-CCNC: 20 U/L (ref 10–40)
BASOPHILS # BLD AUTO: 63 CELLS/UL (ref 0–200)
BASOPHILS NFR BLD AUTO: 1.1 %
BILIRUB SERPL-MCNC: 0.9 MG/DL (ref 0.2–1.2)
BUN SERPL-MCNC: 17 MG/DL (ref 7–25)
BUN/CREAT SERPL: ABNORMAL (CALC) (ref 6–22)
CALCIUM SERPL-MCNC: 9.9 MG/DL (ref 8.6–10.3)
CHLORIDE SERPL-SCNC: 103 MMOL/L (ref 98–110)
CO2 SERPL-SCNC: 30 MMOL/L (ref 20–32)
CREAT SERPL-MCNC: 0.88 MG/DL (ref 0.6–1.29)
EOSINOPHIL # BLD AUTO: 108 CELLS/UL (ref 15–500)
EOSINOPHIL NFR BLD AUTO: 1.9 %
ERYTHROCYTE [DISTWIDTH] IN BLOOD BY AUTOMATED COUNT: 12.8 % (ref 11–15)
GFR/BSA.PRED SERPLBLD CYS-BASED-ARV: 109 ML/MIN/1.73M2
GLOBULIN SER CALC-MCNC: 3.1 G/DL (CALC) (ref 1.9–3.7)
GLUCOSE SERPL-MCNC: 102 MG/DL (ref 65–99)
HCT VFR BLD AUTO: 51.2 % (ref 38.5–50)
HGB BLD-MCNC: 17.3 G/DL (ref 13.2–17.1)
LYMPHOCYTES # BLD AUTO: 1630 CELLS/UL (ref 850–3900)
LYMPHOCYTES NFR BLD AUTO: 28.6 %
MCH RBC QN AUTO: 29.7 PG (ref 27–33)
MCHC RBC AUTO-ENTMCNC: 33.8 G/DL (ref 32–36)
MCV RBC AUTO: 88 FL (ref 80–100)
MONOCYTES # BLD AUTO: 462 CELLS/UL (ref 200–950)
MONOCYTES NFR BLD AUTO: 8.1 %
NEUTROPHILS # BLD AUTO: 3437 CELLS/UL (ref 1500–7800)
NEUTROPHILS NFR BLD AUTO: 60.3 %
PLATELET # BLD AUTO: 243 THOUSAND/UL (ref 140–400)
PMV BLD REES-ECKER: 11 FL (ref 7.5–12.5)
POTASSIUM SERPL-SCNC: 4.4 MMOL/L (ref 3.5–5.3)
PROT SERPL-MCNC: 7.8 G/DL (ref 6.1–8.1)
RBC # BLD AUTO: 5.82 MILLION/UL (ref 4.2–5.8)
SODIUM SERPL-SCNC: 140 MMOL/L (ref 135–146)
URATE SERPL-MCNC: 6.3 MG/DL (ref 4–8)
WBC # BLD AUTO: 5.7 THOUSAND/UL (ref 3.8–10.8)

## 2024-12-17 DIAGNOSIS — M25.519 SHOULDER PAIN, UNSPECIFIED CHRONICITY, UNSPECIFIED LATERALITY: ICD-10-CM

## 2024-12-18 RX ORDER — TRAMADOL HYDROCHLORIDE 50 MG/1
50 TABLET ORAL 3 TIMES DAILY PRN
Qty: 45 TABLET | Refills: 0 | Status: SHIPPED | OUTPATIENT
Start: 2024-12-18

## 2025-01-08 DIAGNOSIS — M25.519 SHOULDER PAIN, UNSPECIFIED CHRONICITY, UNSPECIFIED LATERALITY: ICD-10-CM

## 2025-01-09 RX ORDER — TRAMADOL HYDROCHLORIDE 50 MG/1
50 TABLET ORAL 3 TIMES DAILY PRN
Qty: 45 TABLET | Refills: 0 | Status: SHIPPED | OUTPATIENT
Start: 2025-01-09

## 2025-01-10 ENCOUNTER — RA CDI HCC (OUTPATIENT)
Dept: OTHER | Facility: HOSPITAL | Age: 45
End: 2025-01-10

## 2025-01-17 ENCOUNTER — OFFICE VISIT (OUTPATIENT)
Dept: FAMILY MEDICINE CLINIC | Facility: CLINIC | Age: 45
End: 2025-01-17
Payer: COMMERCIAL

## 2025-01-17 VITALS
BODY MASS INDEX: 35.24 KG/M2 | TEMPERATURE: 97.8 F | DIASTOLIC BLOOD PRESSURE: 82 MMHG | SYSTOLIC BLOOD PRESSURE: 124 MMHG | HEART RATE: 90 BPM | HEIGHT: 70 IN | OXYGEN SATURATION: 97 % | WEIGHT: 246.2 LBS

## 2025-01-17 DIAGNOSIS — E66.09 CLASS 2 OBESITY DUE TO EXCESS CALORIES WITHOUT SERIOUS COMORBIDITY WITH BODY MASS INDEX (BMI) OF 35.0 TO 35.9 IN ADULT: ICD-10-CM

## 2025-01-17 DIAGNOSIS — F11.20 CONTINUOUS OPIOID DEPENDENCE (HCC): ICD-10-CM

## 2025-01-17 DIAGNOSIS — G89.29 CHRONIC RIGHT SHOULDER PAIN: Primary | ICD-10-CM

## 2025-01-17 DIAGNOSIS — E66.812 CLASS 2 OBESITY DUE TO EXCESS CALORIES WITHOUT SERIOUS COMORBIDITY WITH BODY MASS INDEX (BMI) OF 35.0 TO 35.9 IN ADULT: ICD-10-CM

## 2025-01-17 DIAGNOSIS — M25.511 CHRONIC RIGHT SHOULDER PAIN: Primary | ICD-10-CM

## 2025-01-17 PROBLEM — M25.522 ELBOW PAIN, LEFT: Status: RESOLVED | Noted: 2023-07-24 | Resolved: 2025-01-17

## 2025-01-17 PROCEDURE — 99213 OFFICE O/P EST LOW 20 MIN: CPT | Performed by: NURSE PRACTITIONER

## 2025-01-17 NOTE — PROGRESS NOTES
"Name: Nehemiah Greene      : 1980      MRN: [id number]  Encounter Provider: MORRO Olguin  Encounter Date: 2025   Encounter department: [facility]  :  Assessment & Plan  Continuous opioid dependence (HCC)       will update his CSA in 4 months   Chronic right shoulder pain       Patient doing well on the Tramadol prn and is doing well   Class 2 obesity due to excess calories without serious comorbidity with body mass index (BMI) of 35.0 to 35.9 in adult  }  Advised to lose weight and exercise              History of Present Illness     Patient here today for follow up and reports that he is here for his follow up on his pain medication and reports that he is taking for his shoulder pain and is consistently taking for the Shoulder pain that he is having tramadol and is working well for him. Patient also recently had a wellness screening showing everything was normal other than his fasting blood sugar was 100. Patient has been watching his diet and avoiding sugars and has lost some weight.       Review of Systems   Constitutional:  Negative for activity change, appetite change, chills, diaphoresis, fatigue, fever and unexpected weight change.   HENT:  Negative for congestion, ear pain, hearing loss, postnasal drip, sinus pressure, sinus pain, sneezing and sore throat.    Eyes:  Negative for pain, redness and visual disturbance.   Respiratory:  Negative for cough and shortness of breath.    Cardiovascular:  Negative for chest pain and leg swelling.   Gastrointestinal:  Negative for abdominal pain, diarrhea, nausea and vomiting.   Musculoskeletal:  Negative for arthralgias.   Neurological:  Negative for dizziness and light-headedness.   Psychiatric/Behavioral:  Negative for behavioral problems and dysphoric mood.        Objective   /82 (BP Location: Right arm, Patient Position: Sitting)   Pulse 90   Temp 97.8 °F (36.6 °C) (Temporal)   Ht 5' 10\" (1.778 m)   Wt 112 " kg (246 lb 3.2 oz)   SpO2 97%   BMI 35.33 kg/m²      Physical Exam  Constitutional:       General: He is not in acute distress.     Appearance: He is well-developed.   HENT:      Head: Normocephalic and atraumatic.      Right Ear: Tympanic membrane normal.      Left Ear: Tympanic membrane normal.      Nose: Nose normal.      Mouth/Throat:      Mouth: Mucous membranes are moist.   Eyes:      Pupils: Pupils are equal, round, and reactive to light.   Neck:      Thyroid: No thyromegaly.   Cardiovascular:      Rate and Rhythm: Normal rate and regular rhythm.      Heart sounds: Normal heart sounds. No murmur heard.  Pulmonary:      Effort: Pulmonary effort is normal. No respiratory distress.      Breath sounds: Normal breath sounds. No wheezing.   Abdominal:      General: Bowel sounds are normal.      Palpations: Abdomen is soft.   Musculoskeletal:         General: Normal range of motion.      Cervical back: Normal range of motion.   Skin:     General: Skin is warm and dry.   Neurological:      Mental Status: He is alert and oriented to person, place, and time.

## 2025-01-30 DIAGNOSIS — M25.519 SHOULDER PAIN, UNSPECIFIED CHRONICITY, UNSPECIFIED LATERALITY: ICD-10-CM

## 2025-01-30 RX ORDER — TRAMADOL HYDROCHLORIDE 50 MG/1
50 TABLET ORAL 3 TIMES DAILY PRN
Qty: 45 TABLET | Refills: 0 | Status: SHIPPED | OUTPATIENT
Start: 2025-01-30

## 2025-02-10 DIAGNOSIS — F41.9 ANXIETY: ICD-10-CM

## 2025-02-11 RX ORDER — CLONAZEPAM 0.5 MG/1
0.5 TABLET ORAL EVERY 12 HOURS PRN
Qty: 60 TABLET | Refills: 0 | Status: SHIPPED | OUTPATIENT
Start: 2025-02-11

## 2025-02-21 DIAGNOSIS — M25.519 SHOULDER PAIN, UNSPECIFIED CHRONICITY, UNSPECIFIED LATERALITY: ICD-10-CM

## 2025-02-21 RX ORDER — TRAMADOL HYDROCHLORIDE 50 MG/1
50 TABLET ORAL 3 TIMES DAILY PRN
Qty: 45 TABLET | Refills: 0 | Status: SHIPPED | OUTPATIENT
Start: 2025-02-21

## 2025-03-14 DIAGNOSIS — M25.519 SHOULDER PAIN, UNSPECIFIED CHRONICITY, UNSPECIFIED LATERALITY: ICD-10-CM

## 2025-03-14 RX ORDER — TRAMADOL HYDROCHLORIDE 50 MG/1
50 TABLET ORAL 3 TIMES DAILY PRN
Qty: 45 TABLET | Refills: 0 | Status: SHIPPED | OUTPATIENT
Start: 2025-03-14

## 2025-03-28 ENCOUNTER — TELEPHONE (OUTPATIENT)
Dept: DERMATOLOGY | Facility: CLINIC | Age: 45
End: 2025-03-28

## 2025-03-28 NOTE — TELEPHONE ENCOUNTER
Called pt as pt has a referral in chart for Dermatology (Referral WQ) and wanted to see if pt still needs an appt with us. Would offer with an AP at preferred location for a  9am. 10am. 11am, 1pm, or 2pm starting April 1st but there was no answer, left v/m with c/b info if pt is interested and sent letter to My Chart.

## 2025-03-31 NOTE — TELEPHONE ENCOUNTER
Rec'd call from patient stating that he received call regarding referral generated last year from PCP. He states that he does wish to be seen. I offered [facility] or [place], [place] location. Patient states that he'd prefer to be seen in our [place] office. I apologized to patient and told him that we do not have a location in [place]. He stated that he was looking at our business card, [facility] in [place]. I informed him that that was a completely separate office. [facility] was not affiliated with [facility]. Patient states that he'll call there to be scheduled and if needed, he'll return call to our office at [facility].

## 2025-04-01 DIAGNOSIS — M25.519 SHOULDER PAIN, UNSPECIFIED CHRONICITY, UNSPECIFIED LATERALITY: ICD-10-CM

## 2025-04-02 RX ORDER — TRAMADOL HYDROCHLORIDE 50 MG/1
50 TABLET ORAL 3 TIMES DAILY PRN
Qty: 45 TABLET | Refills: 0 | Status: SHIPPED | OUTPATIENT
Start: 2025-04-02

## 2025-04-18 DIAGNOSIS — M25.519 SHOULDER PAIN, UNSPECIFIED CHRONICITY, UNSPECIFIED LATERALITY: ICD-10-CM

## 2025-04-18 RX ORDER — TRAMADOL HYDROCHLORIDE 50 MG/1
50 TABLET ORAL 3 TIMES DAILY PRN
Qty: 45 TABLET | Refills: 0 | Status: SHIPPED | OUTPATIENT
Start: 2025-04-18

## 2025-04-22 DIAGNOSIS — M10.9 ACUTE GOUT INVOLVING TOE OF RIGHT FOOT, UNSPECIFIED CAUSE: ICD-10-CM

## 2025-04-23 RX ORDER — ALLOPURINOL 100 MG/1
200 TABLET ORAL DAILY
Qty: 180 TABLET | Refills: 1 | Status: SHIPPED | OUTPATIENT
Start: 2025-04-23

## 2025-05-07 DIAGNOSIS — M25.519 SHOULDER PAIN, UNSPECIFIED CHRONICITY, UNSPECIFIED LATERALITY: ICD-10-CM

## 2025-05-08 RX ORDER — TRAMADOL HYDROCHLORIDE 50 MG/1
50 TABLET ORAL 3 TIMES DAILY PRN
Qty: 45 TABLET | Refills: 0 | Status: SHIPPED | OUTPATIENT
Start: 2025-05-08

## 2025-05-23 DIAGNOSIS — M25.519 SHOULDER PAIN, UNSPECIFIED CHRONICITY, UNSPECIFIED LATERALITY: ICD-10-CM

## 2025-05-27 RX ORDER — TRAMADOL HYDROCHLORIDE 50 MG/1
50 TABLET ORAL 3 TIMES DAILY PRN
Qty: 45 TABLET | Refills: 0 | Status: SHIPPED | OUTPATIENT
Start: 2025-05-27

## 2025-06-12 DIAGNOSIS — M25.519 SHOULDER PAIN, UNSPECIFIED CHRONICITY, UNSPECIFIED LATERALITY: ICD-10-CM

## 2025-06-12 DIAGNOSIS — F41.9 ANXIETY: ICD-10-CM

## 2025-06-13 RX ORDER — TRAMADOL HYDROCHLORIDE 50 MG/1
50 TABLET ORAL 3 TIMES DAILY PRN
Qty: 45 TABLET | Refills: 0 | Status: SHIPPED | OUTPATIENT
Start: 2025-06-13 | End: 2025-06-17 | Stop reason: SDUPTHER

## 2025-06-13 RX ORDER — CLONAZEPAM 0.5 MG/1
0.5 TABLET ORAL EVERY 12 HOURS PRN
Qty: 60 TABLET | Refills: 0 | Status: SHIPPED | OUTPATIENT
Start: 2025-06-13 | End: 2025-06-17 | Stop reason: SDUPTHER

## 2025-06-17 ENCOUNTER — OFFICE VISIT (OUTPATIENT)
Dept: FAMILY MEDICINE CLINIC | Facility: CLINIC | Age: 45
End: 2025-06-17
Payer: COMMERCIAL

## 2025-06-17 VITALS
BODY MASS INDEX: 36.36 KG/M2 | DIASTOLIC BLOOD PRESSURE: 100 MMHG | HEART RATE: 80 BPM | TEMPERATURE: 98.1 F | SYSTOLIC BLOOD PRESSURE: 142 MMHG | WEIGHT: 254 LBS | OXYGEN SATURATION: 95 % | HEIGHT: 70 IN

## 2025-06-17 DIAGNOSIS — G89.4 CHRONIC PAIN SYNDROME: ICD-10-CM

## 2025-06-17 DIAGNOSIS — Z13.220 SCREENING FOR LIPID DISORDERS: ICD-10-CM

## 2025-06-17 DIAGNOSIS — F41.9 ANXIETY: ICD-10-CM

## 2025-06-17 DIAGNOSIS — M25.519 SHOULDER PAIN, UNSPECIFIED CHRONICITY, UNSPECIFIED LATERALITY: ICD-10-CM

## 2025-06-17 DIAGNOSIS — F11.20 CONTINUOUS OPIOID DEPENDENCE (HCC): Primary | ICD-10-CM

## 2025-06-17 DIAGNOSIS — R03.0 ELEVATED BLOOD PRESSURE READING IN OFFICE WITHOUT DIAGNOSIS OF HYPERTENSION: ICD-10-CM

## 2025-06-17 DIAGNOSIS — F40.10 SOCIAL ANXIETY DISORDER: ICD-10-CM

## 2025-06-17 PROCEDURE — 99214 OFFICE O/P EST MOD 30 MIN: CPT | Performed by: NURSE PRACTITIONER

## 2025-06-17 RX ORDER — TRAMADOL HYDROCHLORIDE 50 MG/1
50 TABLET ORAL EVERY 8 HOURS PRN
Qty: 90 TABLET | Refills: 0 | Status: SHIPPED | OUTPATIENT
Start: 2025-06-17 | End: 2025-07-17

## 2025-06-17 RX ORDER — CLONAZEPAM 0.5 MG/1
0.5 TABLET ORAL EVERY 12 HOURS PRN
Qty: 60 TABLET | Refills: 0 | Status: SHIPPED | OUTPATIENT
Start: 2025-06-17

## 2025-06-17 NOTE — PROGRESS NOTES
Name: Nehemiah Greene      : 1980      MRN: [id number]  Encounter Provider: MORRO Olguin  Encounter Date: 2025   Encounter department: [facility]    :  Assessment & Plan  Continuous opioid dependence (HCC)    Orders:    naloxone (NARCAN) 4 mg/0.1 mL nasal spray; 0.1 mL (4 mg total) into each nostril once for 1 dose If the desired response is not obtained after 2-3 minutes, administer an additional dose using a new spray    Chronic pain syndrome    Orders:    naloxone (NARCAN) 4 mg/0.1 mL nasal spray; 0.1 mL (4 mg total) into each nostril once for 1 dose If the desired response is not obtained after 2-3 minutes, administer an additional dose using a new spray    Comprehensive metabolic panel; Future    CBC and differential; Future    Anxiety    Orders:    clonazePAM (KlonoPIN) 0.5 mg tablet; Take 1 tablet (0.5 mg total) by mouth every 12 (twelve) hours as needed for anxiety    Comprehensive metabolic panel; Future    CBC and differential; Future    Shoulder pain, unspecified chronicity, unspecified laterality    Orders:    traMADol (ULTRAM) 50 mg tablet; Take 1 tablet (50 mg total) by mouth every 8 (eight) hours as needed for moderate pain    Social anxiety disorder         Screening for lipid disorders    Orders:    Lipid Panel with Direct LDL reflex; Future    Elevated blood pressure reading in office without diagnosis of hypertension  Will start checking his BP at home and call with his readings and if >140/90 will start a medication          Continuous opioid dependence (HCC)         Chronic pain syndrome               Treatment Plan: Patient will continue to take the tramadol for his pain in the shoulder     Opiate risks  There are risks associated with opioid medications, including dependence, addiction and tolerance. The patient understands and agrees to use these medications only as prescribed. Potential side effects of the medications include, but are not  limited to, constipation, drowsiness, addiction, impaired judgment and risk of fatal overdose if not taken as prescribed. The patient was warned against driving while taking sedation medications.  Sharing medications is a felony. At this point in time, the patient is showing no signs of addiction, abuse, diversion or suicidal ideation.      Pateint is taking concurrent benzodiazepines. Has been counseled on the risks of taking opioids and benzodiazepines including sedation, increased fall risk, dizziness, addictive potential and death.      Opioid agreement  Pain management agreement was reviewed with patient and signed/updated during visit      Drug screen  Drug screen collected during today's visit      PDMP review  [place] PDMP or [place]  reviewed. No red flags were identified; safe to proceed with prescription      discussing diagnostic results, discussing prognosis, risks and benefits of treatment options, instructions for management, risk factor reductions and documenting in the medical record.           History of Present Illness     Opioid Management:   Type of visit: Follow-up    Pain related diagnoses: Right Shoulder Pain Chronic     Interval history: Patient here today for follow up on his chronic shoulder pain he is managing and is staying active     Aberrant behavior?: No      Adverse effects from medication?: No      Screening Tools/Assessments:    PHQ-2/9:  PHQ-2 score: 0    Brief Pain Inventory (BPI):  1) Throughout our lives, most of us have had pain from time to time (such as minor headaches, sprains, and toothaches). Have you had pain other than these everyday kinds of pain today? Yes  2) Where is your pain located? right shoulder  3) Rate your pain at its worst in the last 24 hours: 7  4) Rate your pain at its least in the last 24 hours: 3  5) Rate your average level of pain: 5  6) Rate your pain right now: 3  7) What treatments or medications are you receiving for your pain? Tramadol  8) In the past  24 hours, how much relief have pain treatments or medication provided? 50%  9) During the past 24 hours, pain has interfered with your:     A) General activity: 3     B) Mood: 3     C) Walking ability: 0     D) Normal work (work outside the home & housework): 0     E) Relations with other people: 0     F) Sleep: 3     G) Enjoyment of life: 3    COMM:  Current COMM Score: 0 (negative, low risk patient)    Drug Screen:  Last drug screen was performed more than 365 days ago  Drug screen result based off current prescriptions: consistent    Opioid agreement:  Active Opioid agreement on file?: No    Opioid agreement signed date: 4/1/2024  Opioid agreement expiration date: 4/1/2025    Naloxone:  Currently prescribed Naloxone (Narcan): Yes      Outpatient Morphine Milligram Equivalents Per Day       6/17/25 - 7/17/25 15 MME/day      Order Name Dose Route Frequency Maximum MME/Day     traMADol (ULTRAM) 50 mg tablet 50 mg Oral Every 8 hours PRN 15 MME/day    Total Potential Morphine Milligram Equivalents Per Day 15 mg      Calculation Information        traMADol 50 mg Tabs: single dose of 50 mg * 3 doses per day * morphine equivalence factor of 0.1 = 15 MME/day                                       7/18/25 and after None                  PDMP Review         Value Time User    PDMP Reviewed  Yes 6/17/2025  8:24 AM MORRO Olguin           Review of Systems   Constitutional:  Negative for activity change, appetite change, chills, diaphoresis, fatigue, fever and unexpected weight change.   HENT:  Negative for congestion, ear pain, hearing loss, postnasal drip, sinus pressure, sinus pain, sneezing and sore throat.    Eyes:  Negative for pain, redness and visual disturbance.   Respiratory:  Negative for cough and shortness of breath.    Cardiovascular:  Negative for chest pain and leg swelling.   Gastrointestinal:  Negative for abdominal pain, diarrhea, nausea and vomiting.   Endocrine: Negative.    Genitourinary:  "Negative.    Musculoskeletal:  Positive for arthralgias and myalgias.   Skin: Negative.    Allergic/Immunologic: Negative.    Neurological:  Negative for dizziness and light-headedness.   Hematological:  Negative for adenopathy.   Psychiatric/Behavioral:  Negative for behavioral problems and dysphoric mood.      Objective   /100 (BP Location: Left arm, Patient Position: Sitting, Cuff Size: Large)   Pulse 80   Temp 98.1 °F (36.7 °C)   Ht 5' 10\" (1.778 m)   Wt 115 kg (254 lb)   SpO2 95%   BMI 36.45 kg/m²     Physical Exam  Constitutional:       General: He is not in acute distress.     Appearance: He is well-developed.   HENT:      Head: Normocephalic and atraumatic.     Eyes:      Pupils: Pupils are equal, round, and reactive to light.     Neck:      Thyroid: No thyromegaly.     Cardiovascular:      Rate and Rhythm: Normal rate and regular rhythm.      Heart sounds: Normal heart sounds. No murmur heard.  Pulmonary:      Effort: Pulmonary effort is normal. No respiratory distress.      Breath sounds: Normal breath sounds. No wheezing.   Abdominal:      General: Bowel sounds are normal.      Palpations: Abdomen is soft.     Musculoskeletal:      Right shoulder: Tenderness and crepitus present. Decreased range of motion.      Cervical back: Normal range of motion.     Skin:     General: Skin is warm and dry.     Neurological:      Mental Status: He is alert and oriented to person, place, and time.               "

## 2025-06-17 NOTE — ASSESSMENT & PLAN NOTE
Will start checking his BP at home and call with his readings and if >140/90 will start a medication

## 2025-06-17 NOTE — ASSESSMENT & PLAN NOTE
Orders:    naloxone (NARCAN) 4 mg/0.1 mL nasal spray; 0.1 mL (4 mg total) into each nostril once for 1 dose If the desired response is not obtained after 2-3 minutes, administer an additional dose using a new spray

## 2025-06-22 LAB
6-BETA NALTREXOL UR CFM-MCNC: NEGATIVE NG/ML
6MAM UR QL: NEGATIVE NG/ML
AMPHETAMINES UR QL: NEGATIVE NG/ML
BARBITURATES UR QL: NEGATIVE NG/ML
BENZODIAZ UR QL: NEGATIVE NG/ML
BUPRENORPHINE UR QL: NEGATIVE NG/ML
BZE UR QL: NEGATIVE NG/ML
COMMENT: NORMAL
CREAT UR-MCNC: 75.5 MG/DL
ETHANOL UR QL: NEGATIVE NG/ML
FENTANYL: NEGATIVE NG/ML
GABAPENTIN UR-MCNC: NEGATIVE NG/ML
Lab: NEGATIVE NG/ML
METHADONE UR QL: NEGATIVE NG/ML
NALTREXONE UR-MCNC: NEGATIVE NG/ML
OPIATES UR QL: NEGATIVE NG/ML
OXIDANTS UR QL: NEGATIVE MCG/ML
OXYCODONE UR QL: NEGATIVE NG/ML
PCP UR QL: NEGATIVE NG/ML
PH UR: 5.8 [PH] (ref 4.5–9)
SL AMB PREGABALIN: NEGATIVE NG/ML
SL AMB RITALINIC ACID: NEGATIVE NG/ML
THC UR QL: NEGATIVE NG/ML

## 2025-06-27 DIAGNOSIS — M25.519 SHOULDER PAIN, UNSPECIFIED CHRONICITY, UNSPECIFIED LATERALITY: ICD-10-CM

## 2025-06-30 RX ORDER — TRAMADOL HYDROCHLORIDE 50 MG/1
50 TABLET ORAL EVERY 8 HOURS PRN
Qty: 90 TABLET | Refills: 0 | OUTPATIENT
Start: 2025-06-30 | End: 2025-07-30

## 2025-07-29 DIAGNOSIS — F11.20 CONTINUOUS OPIOID DEPENDENCE (HCC): ICD-10-CM

## 2025-07-29 DIAGNOSIS — M25.519 SHOULDER PAIN, UNSPECIFIED CHRONICITY, UNSPECIFIED LATERALITY: ICD-10-CM

## 2025-07-29 DIAGNOSIS — G89.4 CHRONIC PAIN SYNDROME: Primary | ICD-10-CM

## 2025-07-29 RX ORDER — TRAMADOL HYDROCHLORIDE 50 MG/1
50 TABLET ORAL EVERY 6 HOURS PRN
COMMUNITY
End: 2025-07-29 | Stop reason: SDUPTHER

## 2025-07-29 RX ORDER — TRAMADOL HYDROCHLORIDE 50 MG/1
50 TABLET ORAL EVERY 6 HOURS PRN
Status: CANCELLED | OUTPATIENT
Start: 2025-07-29

## 2025-07-29 RX ORDER — TRAMADOL HYDROCHLORIDE 50 MG/1
50 TABLET ORAL EVERY 8 HOURS PRN
Qty: 90 TABLET | Refills: 0 | Status: SHIPPED | OUTPATIENT
Start: 2025-07-29 | End: 2025-08-28